# Patient Record
Sex: FEMALE | Race: WHITE | Employment: OTHER | URBAN - METROPOLITAN AREA
[De-identification: names, ages, dates, MRNs, and addresses within clinical notes are randomized per-mention and may not be internally consistent; named-entity substitution may affect disease eponyms.]

---

## 2019-11-13 ENCOUNTER — HOSPITAL ENCOUNTER (OUTPATIENT)
Dept: PHYSICAL THERAPY | Age: 74
Discharge: HOME | End: 2019-11-13
Payer: MEDICARE

## 2019-11-13 PROCEDURE — 97162 PT EVAL MOD COMPLEX 30 MIN: CPT

## 2019-11-13 PROCEDURE — 97110 THERAPEUTIC EXERCISES: CPT

## 2019-11-13 NOTE — PROGRESS NOTES
Ambulatory/Rehab Services H2 Model Falls Risk Assessment    Risk Factors:       No Risk Factors Identified Ability to Rise from Chair:       (0)  Ability to rise in a single movement    Falls Prevention Plan: Mobility Assistance Device (specify):  Cane   Total: (5 or greater = High Risk): 1    ©2010 [facility] . [facility] Patent #[id number].  Federal Law prohibits the replication, distribution or use without written permission from [facility] Hotelements

## 2019-11-13 NOTE — PROGRESS NOTES
Coco Carlisle  : 1945  [place]  Secondary: [facility] at [address] [facility]  [address], [address]  [phone]   [phone]       OUTPATIENT PHYSICAL THERAPY: Daily Treatment Note 2019   ICD-10: Treatment Diagnosis:   R26.2 Difficulty in walking, not elsewhere classified     M25.662 Stiffness of left knee, not elsewhere classified     M25.562 Pain in left knee     Pre-treatment Symptoms/Complaints:The patient reported a recent onset of l knee pain, swelling, and weakness. She is presently ambulating with a cane, and has difficulty rising from a seated posture or climbing stairs. She has been referred to physical therapy for treatment. Pain: Initial: Pain Intensity 1: 9  Post Session:  9/10   Medications Last Reviewed:  2019  Updated Objective Findings:  See evaluation note from today   TREATMENT:   Therapeutic Exercise: ( 15): The patient received treatment as below to improve mobility, strength and balance. Required moderate verbal and manual cues to promote proper body alignment, promote proper body posture and promote proper body mechanics. Progressed resistance, range and repetitions as indicated. The patient received exercise instruction followed by patient demonstration of the exercises to include AROM, PROM, and strengthening exercises along with heel cord stretching and proper use of a cane with ambulation to stabilize the patient's LE and improve AROM to decrease spasms, pain, and improve function. She is also to continue aquatic therapy for strengthening and improved AROM. [facility] Portal  Evaluation: ( X )  Manual Therapy (     ):   Therapeutic Modalities:   Observation/Orthostatic Postural Assessment:   Independent ambulation with a cane.   Palpation:   Tenderness to l medial and posterior joint line  ROM: AROM : KNEE     R        L   Extension           0       +8   Flexion 140     115                    Strength:    +3/5 L LE Strength    5/5 R LE Strength           Special Tests: N/A  Neurological Screen: N/A  Functional Mobility: Independent with a cane. Balance:  Good. Treatment/Session Summary:    · Response to Treatment:  The patient was instructed in heel cord stretching, along with gait instruction with a cane. The patient will continue with aquatic exercises. · Communication/Consultation:  None today  · Equipment provided today:  None today  · Recommendations/Intent for next treatment session: Next visit will focus on advancements in therapeutic exercises to improve function.   Treatment Plan of Care Effective Dates:  11/13/2019 to 01/13/2020  Total Treatment Billable Duration:  45 minutes  PT Patient Time In/Time Out  Time In: 0900  Time Out: [address] [facility] [address] PT    Future Appointments   Date Time Provider Priscila Ness   11/18/2019 11:00 AM [facility]   11/20/2019 10:[facility]   11/25/2019 11:00 AM [facility]   11/27/2019 10:00 AM [facility]

## 2019-11-13 NOTE — THERAPY EVALUATION
Candis Alvarado  : 1945  [place] / [place] / Product Type: Medicare /  [address]  at [address], [address]  [phone]   [phone]      OUTPATIENT PHYSICAL THERAPY:Initial Assessment 2019    ICD-10: Treatment Diagnosis:   R26.2 Difficulty in walking, not elsewhere classified     M25.662 Stiffness of left knee, not elsewhere classified     M25.562 Pain in left knee     Precautions/Allergies:   Pcn [penicillins]   Fall Risk Score: 1 (? 5 = High Risk)  MD Orders: Evaluate and treat MEDICAL/REFERRING DIAGNOSIS:  Pain in left knee [M25.562]   DATE OF ONSET: 10/2019  REFERRING PHYSICIAN: Prema Marie PA*  RETURN PHYSICIAN APPOINTMENT: TBD     INITIAL ASSESSMENT:  Ms. Debbie Phelan reported a recent onset of l knee pain, swelling, and weakness. She is presently ambulating with a cane, and has difficulty rising from a seated posture or climbing stairs. She has been referred to physical therapy for treatment. PROBLEM LIST (Impacting functional limitations):  1. Decreased Strength  2. Decreased ADL/Functional Activities  3. Increased Pain  4. Decreased Activity Tolerance INTERVENTIONS PLANNED:  1. Home Exercise Program (HEP)  2. Neuromuscular Re-education/Strengthening  3. Range of Motion (ROM)  4. Therapeutic Exercise/Strengthening  5. Aquatic Therapy   TREATMENT PLAN:  Effective Dates: 2019 TO 2020. Frequency/Duration: 2 times a week for 8 weeks  GOALS: (Goals have been discussed and agreed upon with patient.)  SHORT-TERM FUNCTIONAL GOALS: Time Frame: 4 weeks  1. Increase LEFS 4 points to decrease pain 1-2 levels. 2. Increase L knee AROM 10 degrees to decrease pain 1-2 levels. 3. Increase L LE strength to allow initiation of stairs. DISCHARGE GOALS: Time Frame: 8 weeks  1. Increase LEFS 9 points to decrease pain +2 levels.   2. Increase L LE strength to allow reciprocal stairs. 3. Demonstrate independence in home exercises to allow DC from physical therapy. Rehabilitation Potential For Stated Goals: Good  Regarding Zehra Chacon's therapy, I certify that the treatment plan above will be carried out by a therapist or under their direction. Thank you for this referral,  Kimberlee Taylor., PT     Referring Physician Signature: VAISHALI Mcclure*              Date                    The information in this section was collected on 11/13/2019 (except where otherwise noted). HISTORY:   History of Present Injury/Illness (Reason for Referral): The patient reported a recent onset of l knee pain, swelling, and weakness. She is presently ambulating with a cane, and has difficulty rising from a seated posture or climbing stairs. She has been referred to physical therapy for treatment. Past Medical History/Comorbidities:   Ms. Marni Walters  has no past medical history on file. Ms. Marni Walters  has a past surgical history that includes hx gyn. Social History/Living Environment:    Independent  Prior Level of Function/Work/Activity:  Retired  Dominant Side:         RIGHT  Current Medications:       Current Outpatient Medications:     carboxymethylcellulose sodium (REFRESH CELLUVISC) 1 % ophthalmic solution, Apply 1 Drop to eye as needed. , Disp: , Rfl:     peg 400-propylene glycol (SYSTANE) 0.4-0.3 % drop, Administer  to left eye as needed. , Disp: , Rfl:     methylPREDNISolone (MEDROL, PO,) 4 mg tablet, Per dose pack instructions, Disp: 1 Each, Rfl: 0    RESTASIS 0.05 % ophthalmic emulsion, , Disp: , Rfl:     raloxifene (EVISTA) 60 mg tablet, Take 60 mg by mouth daily. Indications: pm, Disp: , Rfl:    Date Last Reviewed:  11/13/2019   Number of Personal Factors/Comorbidities that affect the Plan of Care: 3+: HIGH COMPLEXITY   EXAMINATION:     Observation/Orthostatic Postural Assessment:   Independent ambulation with a cane.   Palpation:   Tenderness to l medial and posterior joint line  ROM: AROM : KNEE     R        L   Extension           0       +8   Flexion            140     115                    Strength:    +3/5 L LE Strength    5/5 R LE Strength           Special Tests: N/A  Neurological Screen: N/A  Functional Mobility: Independent with a cane. Balance:  Good. Body Structures Involved:  1. Joints  2. Muscles Body Functions Affected:  1. Sensory/Pain  2. Neuromusculoskeletal  3. Movement Related Activities and Participation Affected:  1. General Tasks and Demands  2. Mobility  3. Community, Social and [place]   Number of elements (examined above) that affect the Plan of Care: 4+: HIGH COMPLEXITY   CLINICAL PRESENTATION:   Presentation: Evolving clinical presentation with changing clinical characteristics: MODERATE COMPLEXITY   CLINICAL DECISION MAKING:   Outcome Measure: Tool Used: Lower Extremity Functional Scale (LEFS)  Score:  Initial: 28/80 Most Recent: X/80 (Date: -- )   Interpretation of Score: 20 questions each scored on a 5 point scale with 0 representing \"extreme difficulty or unable to perform\" and 4 representing \"no difficulty\". The lower the score, the greater the functional disability. 80/80 represents no disability. Minimal detectable change is 9 points. Medical Necessity:   · Patient demonstrates good rehab potential due to higher previous functional level. Reason for Services/Other Comments:  · Patient continues to require skilled intervention due to her limited ability to ambulate independently due to L knee pain. .   Use of outcome tool(s) and clinical judgement create a POC that gives a: Questionable prediction of patient's progress: Bonnie Hood PT

## 2019-11-18 ENCOUNTER — HOSPITAL ENCOUNTER (OUTPATIENT)
Dept: PHYSICAL THERAPY | Age: 74
Discharge: HOME | End: 2019-11-18
Payer: MEDICARE

## 2019-11-18 PROCEDURE — 97110 THERAPEUTIC EXERCISES: CPT

## 2019-11-18 NOTE — PROGRESS NOTES
Sourav Ga  : 1945  [place]  [place] [address] at [address] [facility]  [address], [address]  [phone]   [phone]       OUTPATIENT PHYSICAL THERAPY: Daily Treatment Note 2019   ICD-10: Treatment Diagnosis:   R26.2 Difficulty in walking, not elsewhere classified     M25.662 Stiffness of left knee, not elsewhere classified     M25.562 Pain in left knee     Pre-treatment Symptoms/Complaints: Patient reports knee is still very tender with certain movements, but she has been able to walk better. Pain: Initial: Pain Intensity 1: 7  Post Session:  9/10   Medications Last Reviewed:  2019  Updated Objective Findings:  See evaluation note from today   TREATMENT:   Therapeutic Exercise: ( ):  The patient received treatment as below to improve mobility, strength and balance. Required moderate verbal and manual cues to promote proper body alignment, promote proper body posture and promote proper body mechanics. Progressed resistance, range and repetitions as indicated. The patient received exercise instruction followed by patient demonstration of the exercises to include AROM, PROM, and strengthening exercises along with heel cord stretching and proper use of a cane with ambulation to stabilize the patient's LE and improve AROM to decrease spasms, pain, and improve function. She is also to continue aquatic therapy for strengthening and improved AROM. [facility] Portal  Evaluation:  Manual Therapy (     ):   Therapeutic Modalities:   Aquatic Therapy (60 minutes): Aquatic treatment performed per flow grid for Decompression, Ease of movement, Low impact and reduced weight bearing activity and increased pain level. Cues provided for proper posture and correct body mechaincis. Patient has difficulty with prolonged standing, walking and stairs.       Aquatic Exercise Log       Date  19 Date Date   Date   Date     Activity/ Exercise Parameters Parameters Parameters Parameters Parameters   Walking forward 5 min       Walking backward        Walking sideways 5 min         Marching X 20       LE Exercises          Hip Flex/Ext X 20         Hip Abd/Add X 20         Hip flex/knee ext  X 20         Calf raises          Knee Flex X 20         Squats          Step Ups X 20       UE Exercises          Squeeze In          Push Down          Pull Down        Deep H2O/ Noodles          Bicycle  4 min seated       Cross   Country          Scissors          Stretches          Hamstrings 3 x 10          Heelcords            Observation/Orthostatic Postural Assessment:   Independent ambulation with a cane. Palpation:   Tenderness to l medial and posterior joint line  ROM: AROM : KNEE     R        L   Extension           0       +8   Flexion            140     115                    Strength:    +3/5 L LE Strength    5/5 R LE Strength           Special Tests: N/A  Neurological Screen: N/A  Functional Mobility: Independent with a cane. Balance:  Good. Treatment/Session Summary:    · Response to Treatment: Patient tolerated treatment with mild discomfort today. Patient demonstrated good effort with all aquatic exercises and indicated less discomfort following treatment today. Patient purchased a brace and has started using a cane to help with ambulation. Instructed patient to continue home exercises as directed. · Communication/Consultation:  None today  · Equipment provided today:  None today  · Recommendations/Intent for next treatment session: Next visit will focus on advancements in therapeutic exercises to improve function.   Treatment Plan of Care Effective Dates:  11/13/2019 to 01/13/2020  Total Treatment Billable Duration:  60 minutes  PT Patient Time In/Time Out  Time In: 1100  Time Out: [address] [place], [place]    Future Appointments   Date Time Provider Priscila Ness   11/20/2019 10:00 AM Gunnar Galindo Ulic[facility] Perrin [facility]   11/25/2019 11:00 AM Jacque SIMPSON [facility]   11/27/2019 10:00 AM Jacque SIMPSON [facility]

## 2019-11-20 ENCOUNTER — HOSPITAL ENCOUNTER (OUTPATIENT)
Dept: PHYSICAL THERAPY | Age: 74
Discharge: HOME | End: 2019-11-20
Payer: MEDICARE

## 2019-11-20 PROCEDURE — 97113 AQUATIC THERAPY/EXERCISES: CPT

## 2019-11-20 NOTE — PROGRESS NOTES
Josiah Moreland  : 1945  [place]  Secondary: [facility] at [address]  [address], [address]  [phone]   [phone]       OUTPATIENT PHYSICAL THERAPY: Daily Treatment Note 2019   ICD-10: Treatment Diagnosis:   R26.2 Difficulty in walking, not elsewhere classified     M25.662 Stiffness of left knee, not elsewhere classified     M25.562 Pain in left knee     Pre-treatment Symptoms/Complaints: Patient reports knee is still very tight, but pain has improved some. Pain: Initial: Pain Intensity 1: 6  Post Session:  9/10   Medications Last Reviewed:  2019  Updated Objective Findings:  None Today   TREATMENT:   Therapeutic Exercise: ( ):  The patient received treatment as below to improve mobility, strength and balance. Required moderate verbal and manual cues to promote proper body alignment, promote proper body posture and promote proper body mechanics. Progressed resistance, range and repetitions as indicated. The patient received exercise instruction followed by patient demonstration of the exercises to include AROM, PROM, and strengthening exercises along with heel cord stretching and proper use of a cane with ambulation to stabilize the patient's LE and improve AROM to decrease spasms, pain, and improve function. She is also to continue aquatic therapy for strengthening and improved AROM. [facility] Portal  Evaluation:  Manual Therapy (     ):   Therapeutic Modalities:   Aquatic Therapy (60 minutes): Aquatic treatment performed per flow grid for Decompression, Ease of movement, Low impact and reduced weight bearing activity and increased pain level. Cues provided for proper posture and correct body mechaincis. Patient has difficulty with prolonged standing, walking and stairs.       Aquatic Exercise Log       Date  19 Date  19 Date   Date   Date     Activity/ Exercise Parameters Parameters Parameters Parameters Parameters   Walking forward 5 min 5 min      Walking backward        Walking sideways 5 min 5 min        Marching X 20 X 20      LE Exercises          Hip Flex/Ext X 20 X 20        Hip Abd/Add X 20 X 20        Hip flex/knee ext  X 20 X 20        Calf raises          Knee Flex X 20 X 20         Squats          Step Ups X 20 X 20      UE Exercises          Squeeze In          Push Down          Pull Down        Deep H2O/ Noodles          Bicycle  4 min seated       Cross   Country        Flutter kicks   5 min        Scissors          Stretches          Hamstrings 3 x 10  3 x10        Heelcords            Observation/Orthostatic Postural Assessment:   Independent ambulation with a cane. Palpation:   Tenderness to l medial and posterior joint line  ROM: AROM : KNEE     R        L   Extension           0       +8   Flexion            140     115                    Strength:    +3/5 L LE Strength    5/5 R LE Strength           Special Tests: N/A  Neurological Screen: N/A  Functional Mobility: Independent with a cane. Balance:  Good. Treatment/Session Summary:    · Response to Treatment: Patient tolerated treatment with mild discomfort today. Patient demonstrated good effort with all aquatic exercises and seems very pleased with the pool. Suggested patient try to ice some today to help with swelling. Patient indicated that the surgie  really seemed to help. Instructed patient to continue home exercises as directed. · Communication/Consultation:  None today  · Equipment provided today:  None today  · Recommendations/Intent for next treatment session: Next visit will focus on advancements in therapeutic exercises to improve function.   Treatment Plan of Care Effective Dates:  11/13/2019 to 01/13/2020  Total Treatment Billable Duration:  60 minutes  PT Patient Time In/Time Out  Time In: 1000  Time Out: [address] [place], [place]    Future Appointments   Date Time Provider Priscila Ness   11/25/2019 11:00 AM Mikhail Newberry [facility]   11/27/2019 10:00 AM Mikhail SIMPSON [facility]

## 2019-11-25 ENCOUNTER — HOSPITAL ENCOUNTER (OUTPATIENT)
Dept: PHYSICAL THERAPY | Age: 74
Discharge: HOME | End: 2019-11-25
Payer: MEDICARE

## 2019-11-25 PROCEDURE — 97113 AQUATIC THERAPY/EXERCISES: CPT

## 2019-11-25 NOTE — PROGRESS NOTES
Ashley Thomas  : 1945  [place]  Secondary: [facility] at [address]  [address], [address]  [phone]   [phone]       OUTPATIENT PHYSICAL THERAPY: Daily Treatment Note 2019   ICD-10: Treatment Diagnosis:   R26.2 Difficulty in walking, not elsewhere classified     M25.662 Stiffness of left knee, not elsewhere classified     M25.562 Pain in left knee     Pre-treatment Symptoms/Complaints: Patient reports knee is still painful with certain movements, but swelling is better. Pain: Initial: Pain Intensity 1: (P) 4  Post Session:  3/10   Medications Last Reviewed:  2019  Updated Objective Findings:  None Today   TREATMENT:   Therapeutic Exercise: ( ):  The patient received treatment as below to improve mobility, strength and balance. Required moderate verbal and manual cues to promote proper body alignment, promote proper body posture and promote proper body mechanics. Progressed resistance, range and repetitions as indicated. The patient received exercise instruction followed by patient demonstration of the exercises to include AROM, PROM, and strengthening exercises along with heel cord stretching and proper use of a cane with ambulation to stabilize the patient's LE and improve AROM to decrease spasms, pain, and improve function. She is also to continue aquatic therapy for strengthening and improved AROM. [facility] Portal  Evaluation:  Manual Therapy (     ):   Therapeutic Modalities:   Aquatic Therapy (60 minutes): Aquatic treatment performed per flow grid for Decompression, Ease of movement, Low impact and reduced weight bearing activity and increased pain level. Cues provided for proper posture and correct body mechaincis. Patient has difficulty with prolonged standing, walking and stairs.       Aquatic Exercise Log       Date  19 Date  19 Date  19 Date Date     Activity/ Exercise Parameters Parameters Parameters Parameters Parameters   Walking forward 5 min 5 min 5 min     Walking backward        Walking sideways 5 min 5 min 5 min       Marching X 20 X 20 X 20     LE Exercises          Hip Flex/Ext X 20 X 20 X 20       Hip Abd/Add X 20 X 20 X 20       Hip flex/knee ext  X 20 X 20 X 20       Calf raises          Knee Flex X 20 X 20  X 20       Squats          Step Ups X 20 X 20 X 20     UE Exercises          Squeeze In          Push Down          Pull Down        Deep H2O/ Noodles          Bicycle  4 min seated  8 min seated       Cross   Country        Flutter kicks   5 min        Scissors   5 min       Stretches          Hamstrings 3 x 10  3 x10 4 x 30 sec       Heelcords            Observation/Orthostatic Postural Assessment:   Independent ambulation with a cane. Palpation:   Tenderness to l medial and posterior joint line  ROM: AROM : KNEE     R        L   Extension           0       +8   Flexion            140     115                    Strength:    +3/5 L LE Strength    5/5 R LE Strength           Special Tests: N/A  Neurological Screen: N/A  Functional Mobility: Independent with a cane. Balance:  Good. Treatment/Session Summary:    · Response to Treatment: Patient tolerated treatment with mild discomfort today. Patient demonstrated good effort with all aquatic exercises and seems very pleased with the pool. Patient indicates that the swelling is much better, but she hasn't been able to go without any type of bracing due to the pain with certain movements and prolonged walking. Patient seems pleased with her progress and the fact that she has been able to do more since starting the pool. Instructed patient to continue home exercises as directed.   · Communication/Consultation:  None today  · Equipment provided today:  None today  · Recommendations/Intent for next treatment session: Next visit will focus on advancements in therapeutic exercises to improve function.   Treatment Plan of Care Effective Dates:  11/13/2019 to 01/13/2020  Total Treatment Billable Duration:  60 minutes  PT Patient Time In/Time Out  Time In: (P) 1100  Time Out: (P) [address] [facility]    Future Appointments   Date Time Provider Priscila Ness   11/27/2019 10:00 AM [facility]   12/3/2019  9:00 AM [facility]   [facility]   [facility]   [facility]   12/16/2019 11:00 AM Essence Agrawal, PT [facility]   [facility]/18[facility]2019 [facility]   [facility]23[facility]2019 [facility]

## 2019-11-27 ENCOUNTER — HOSPITAL ENCOUNTER (OUTPATIENT)
Dept: PHYSICAL THERAPY | Age: 74
Discharge: HOME | End: 2019-11-27
Payer: MEDICARE

## 2019-11-27 PROCEDURE — 97113 AQUATIC THERAPY/EXERCISES: CPT

## 2019-11-27 NOTE — PROGRESS NOTES
[facility]  : 1945  [place]  [place]: [place] [address] at [place] [address] Therapy  [address], [address]  [phone]   [phone]       OUTPATIENT PHYSICAL THERAPY: Daily Treatment Note 2019   ICD-10: Treatment Diagnosis:   R26.2 Difficulty in walking, not elsewhere classified     M25.662 Stiffness of left knee, not elsewhere classified     M25.562 Pain in left knee     Pre-treatment Symptoms/Complaints: Patient reports knee is coming along slowly. Pain: Initial: Pain Intensity 1: 4  Post Session:  3/10   Medications Last Reviewed:  2019  Updated Objective Findings:  None Today   TREATMENT:   Therapeutic Exercise: ( ):  The patient received treatment as below to improve mobility, strength and balance. Required moderate verbal and manual cues to promote proper body alignment, promote proper body posture and promote proper body mechanics. Progressed resistance, range and repetitions as indicated. The patient received exercise instruction followed by patient demonstration of the exercises to include AROM, PROM, and strengthening exercises along with heel cord stretching and proper use of a cane with ambulation to stabilize the patient's LE and improve AROM to decrease spasms, pain, and improve function. She is also to continue aquatic therapy for strengthening and improved AROM. [facility] Portal  Evaluation:  Manual Therapy (     ):   Therapeutic Modalities:   Aquatic Therapy (60 minutes): Aquatic treatment performed per flow grid for Decompression, Ease of movement, Low impact and reduced weight bearing activity and increased pain level. Cues provided for proper posture and correct body mechaincis. Patient has difficulty with prolonged standing, walking and stairs.       Aquatic Exercise Log       Date  19 Date  19 Date  19 Date  19 Date     Activity/ Exercise Parameters Parameters Parameters Parameters Parameters   Walking forward 5 min 5 min 5 min 5 min    Walking backward        Walking sideways 5 min 5 min 5 min 5 min      Marching X 20 X 20 X 20 X 20    LE Exercises          Hip Flex/Ext X 20 X 20 X 20 X 20      Hip Abd/Add X 20 X 20 X 20 X 20      Hip flex/knee ext  X 20 X 20 X 20 X 20      Calf raises          Knee Flex X 20 X 20  X 20 X 20      Squats          Step Ups X 20 X 20 X 20 X 20    UE Exercises          Squeeze In          Push Down          Pull Down        Deep H2O/ Noodles          Bicycle  4 min seated  8 min seated   4 min seated    Cross   Country        Flutter kicks   5 min        Scissors   5 min 4 min seated      Stretches          Hamstrings 3 x 10  3 x10 4 x 30 sec 4 x 30 sec      Heelcords            Observation/Orthostatic Postural Assessment:   Independent ambulation with a cane. Palpation:   Tenderness to l medial and posterior joint line  ROM: AROM : KNEE     R        L   Extension           0       +8   Flexion            140     115                    Strength:    +3/5 L LE Strength    5/5 R LE Strength           Special Tests: N/A  Neurological Screen: N/A  Functional Mobility: Independent with a cane. Balance:  Good. Treatment/Session Summary:    · Response to Treatment: Patient tolerated treatment with mild discomfort today. Patient demonstrated good effort with all aquatic exercises and continues to be very pleased with the pool. Patient indicates she is not wearing the brace as much as she was and everyday she seems to be able to do a little more. Patient hopes she doesn't have to have a shot. Instructed patient to continue home exercises as directed. · Communication/Consultation:  None today  · Equipment provided today:  None today  · Recommendations/Intent for next treatment session: Next visit will focus on advancements in therapeutic exercises to improve function.   Treatment Plan of Care Effective Dates:  11/13/2019 to 01/13/2020  Total Treatment Billable Duration:  60 minutes  PT Patient Time In/Time Out  Time In: 1000  Time Out: [address] [place], [place]    Future Appointments   Date Time Provider Priscila Ness   12/3/2019  9:00 AM Eric Shall [facility]   12/5/2019  1:00 PM [facility] Shall [facility]   12/9/2019 10:00 AM Eric Shall [facility]   12/11/2019 10:00 AM Eric Shall [facility]   12/16/2019 11:00 AM Tressa Crockett, PT [facility]   12/18/2019 10:00 AM Eric Shall [facility]   12/23/2019 10:00 AM Eric Shall [facility]   12/26/2019  9:00 AM Eric Shall [facility]

## 2019-12-03 ENCOUNTER — HOSPITAL ENCOUNTER (OUTPATIENT)
Dept: PHYSICAL THERAPY | Age: 74
Discharge: HOME | End: 2019-12-03
Payer: MEDICARE

## 2019-12-03 PROCEDURE — 97113 AQUATIC THERAPY/EXERCISES: CPT

## 2019-12-03 NOTE — PROGRESS NOTES
Rachid Sanchez  : 1945  [place]  Secondary: [facility] at [address]  [address], [address]  [phone]   [phone]       OUTPATIENT PHYSICAL THERAPY: Daily Treatment Note 12/3/2019   ICD-10: Treatment Diagnosis:   R26.2 Difficulty in walking, not elsewhere classified     M25.662 Stiffness of left knee, not elsewhere classified     M25.562 Pain in left knee     Pre-treatment Symptoms/Complaints: Patient reports knee has felt better and not as swollen since last week. Pain: Initial: Pain Intensity 1: 4  Post Session:  3/10   Medications Last Reviewed:  12/3/2019  Updated Objective Findings:  None Today   TREATMENT:   Therapeutic Exercise: ( ):  The patient received treatment as below to improve mobility, strength and balance. Required moderate verbal and manual cues to promote proper body alignment, promote proper body posture and promote proper body mechanics. Progressed resistance, range and repetitions as indicated. The patient received exercise instruction followed by patient demonstration of the exercises to include AROM, PROM, and strengthening exercises along with heel cord stretching and proper use of a cane with ambulation to stabilize the patient's LE and improve AROM to decrease spasms, pain, and improve function. She is also to continue aquatic therapy for strengthening and improved AROM. [facility] Portal  Evaluation:  Manual Therapy (     ):   Therapeutic Modalities:   Aquatic Therapy (60 minutes): Aquatic treatment performed per flow grid for Decompression, Ease of movement, Low impact and reduced weight bearing activity and increased pain level. Cues provided for proper posture and correct body mechaincis. Patient has difficulty with prolonged standing, walking and stairs.       Aquatic Exercise Log       Date  19 Date  19 Date  19 Date  19 Date Activity/ Exercise Parameters Parameters Parameters Parameters Parameters   Walking forward 5 min 5 min 5 min 5 min 5 min   Walking backward        Walking sideways 5 min 5 min 5 min 5 min 5 min     Marching X 20 X 20 X 20 X 20 X 30   LE Exercises          Hip Flex/Ext X 20 X 20 X 20 X 20 X 30     Hip Abd/Add X 20 X 20 X 20 X 20 X 30     Hip flex/knee ext  X 20 X 20 X 20 X 20 X 30     Calf raises          Knee Flex X 20 X 20  X 20 X 20 X 30     Squats          Step Ups X 20 X 20 X 20 X 20 X 30   UE Exercises          Squeeze In          Push Down          Pull Down        Deep H2O/ Noodles          Bicycle  4 min seated  8 min seated   4 min seated 6 min seated   Cross   Country        Flutter kicks   5 min        Scissors   5 min 4 min seated 4 min seated     Stretches          Hamstrings 3 x 10  3 x10 4 x 30 sec 4 x 30 sec 4 x 30 sec     Heelcords            Observation/Orthostatic Postural Assessment:   Independent ambulation with a cane. Palpation:   Tenderness to l medial and posterior joint line  ROM: AROM : KNEE     R        L   Extension           0       +8   Flexion            140     115                    Strength:    +3/5 L LE Strength    5/5 R LE Strength           Special Tests: N/A  Neurological Screen: N/A  Functional Mobility: Independent with a cane. Balance:  Good. Treatment/Session Summary:    · Response to Treatment: Patient tolerated treatment with mild discomfort today. Patient demonstrated good effort with all aquatic exercises and continues to be very pleased with the pool. Patient plans to contact the doctor later this month to discuss a possible shot before her trip. Instructed patient to continue home exercises as directed. · Communication/Consultation:  None today  · Equipment provided today:  None today  · Recommendations/Intent for next treatment session: Next visit will focus on advancements in therapeutic exercises to improve function.   Treatment Plan of Care Effective Dates:  11/13/2019 to 01/13/2020  Total Treatment Billable Duration:  60 minutes  PT Patient Time In/Time Out  Time In: 0900  Time Out: [address] [place] [address]    Future Appointments   Date Time Provider Priscila Ness   12/5/2019  1:00 PM [facility]   12/9/2019 10:00 AM [facility]   12/11/2019 10:00 AM [facility]   12/16/2019 11:00 AM Samanta Cordoba, PT [facility]   12/18/2019 10:00 AM [facility]   12/23/2019 10:00 AM [facility]   12/26/2019  9:00 AM [facility]

## 2019-12-05 ENCOUNTER — HOSPITAL ENCOUNTER (OUTPATIENT)
Dept: PHYSICAL THERAPY | Age: 74
Discharge: HOME | End: 2019-12-05
Payer: MEDICARE

## 2019-12-05 PROCEDURE — 97113 AQUATIC THERAPY/EXERCISES: CPT

## 2019-12-05 NOTE — PROGRESS NOTES
Anselmo Cordoba  : 1945  [place]  [place]: [place] [address] at [address] [facility]  [address], [address]  [phone]   [phone]       OUTPATIENT PHYSICAL THERAPY: Daily Treatment Note 2019   ICD-10: Treatment Diagnosis:   R26.2 Difficulty in walking, not elsewhere classified     M25.662 Stiffness of left knee, not elsewhere classified     M25.562 Pain in left knee     Pre-treatment Symptoms/Complaints: Patient reports she saw the orthopedic today and had an x-ray which looked good, but is scheduled to have an MRI on Monday. And will follow up to plan next course of action. Pain: Initial: Pain Intensity 1: 2  Post Session:  3/10   Medications Last Reviewed:  2019  Updated Objective Findings:  None Today   TREATMENT:   Therapeutic Exercise: ( ):  The patient received treatment as below to improve mobility, strength and balance. Required moderate verbal and manual cues to promote proper body alignment, promote proper body posture and promote proper body mechanics. Progressed resistance, range and repetitions as indicated. The patient received exercise instruction followed by patient demonstration of the exercises to include AROM, PROM, and strengthening exercises along with heel cord stretching and proper use of a cane with ambulation to stabilize the patient's LE and improve AROM to decrease spasms, pain, and improve function. She is also to continue aquatic therapy for strengthening and improved AROM. [facility] Portal  Evaluation:  Manual Therapy (     ):   Therapeutic Modalities:   Aquatic Therapy (60 minutes): Aquatic treatment performed per flow grid for Decompression, Ease of movement, Low impact and reduced weight bearing activity and increased pain level. Cues provided for proper posture and correct body mechaincis. Patient has difficulty with prolonged standing, walking and stairs. Aquatic Exercise Log       Date  11-18-19 Date  11-20-19 Date  11-25-19 Date  11-27-19 Date  12-3-19 Date  12-5-19   Activity/ Exercise Parameters Parameters Parameters Parameters Parameters Parameters   Walking forward 5 min 5 min 5 min 5 min 5 min 5 min   Walking backward         Walking sideways 5 min 5 min 5 min 5 min 5 min 5 min     Marching X 20 X 20 X 20 X 20 X 30 X 30   LE Exercises           Hip Flex/Ext X 20 X 20 X 20 X 20 X 30 X 30     Hip Abd/Add X 20 X 20 X 20 X 20 X 30 X 30     Hip flex/knee ext  X 20 X 20 X 20 X 20 X 30 X 30     Calf raises           Knee Flex X 20 X 20  X 20 X 20 X 30 X 30     Squats      X 20     Step Ups X 20 X 20 X 20 X 20 X 30 X 30   UE Exercises           Squeeze In           Push Down           Pull Down         Deep H2O/ Noodles           Bicycle  4 min seated  8 min seated   4 min seated 6 min seated 6 min  seated   Cross   Country         Flutter kicks   5 min         Scissors   5 min 4 min seated 4 min seated 6 min seated      Stretches           Hamstrings 3 x 10  3 x10 4 x 30 sec 4 x 30 sec 4 x 30 sec 3 x 45 sec     Heelcords             Observation/Orthostatic Postural Assessment:   Independent ambulation with a cane. Palpation:   Tenderness to l medial and posterior joint line  ROM: AROM : KNEE     R        L   Extension           0       +8   Flexion            140     115                    Strength:    +3/5 L LE Strength    5/5 R LE Strength           Special Tests: N/A  Neurological Screen: N/A  Functional Mobility: Independent with a cane. Balance:  Good. Treatment/Session Summary:    · Response to Treatment: Patient tolerated treatment with mild discomfort today. Patient demonstrated good effort with all aquatic exercises and continues to be very pleased with the pool. Patient was pleased that her knee wasn't as swollen and her pain level has decreased tremendously. Instructed patient to continue home exercises as directed.   · Communication/Consultation: None today  · Equipment provided today:  None today  · Recommendations/Intent for next treatment session: Next visit will focus on advancements in therapeutic exercises to improve function.   Treatment Plan of Care Effective Dates:  11/13/2019 to 01/13/2020  Total Treatment Billable Duration:  60 minutes  PT Patient Time In/Time Out  Time In: 0100  Time Out: 600 Merlene Broderick PTA    Future Appointments   Date Time Provider Priscila Ness   12/9/2019 10:00 AM Lazara Thibodeaux [facility]   12/11/2019 10:00 AM Lazara Thibodeaux [facility]   12/16/2019 11:00 AM Jaden Dubon, PT [facility]   12/18/2019 10:00 AM Lazara Thibodeaux [facility]   12/23/2019 10:00 AM Lazara Thibodeaux [facility]   12/26/2019  9:00 AM Lazara Thibodeaux [facility]

## 2019-12-09 ENCOUNTER — APPOINTMENT (OUTPATIENT)
Dept: PHYSICAL THERAPY | Age: 74
End: 2019-12-09
Payer: MEDICARE

## 2019-12-10 ENCOUNTER — HOSPITAL ENCOUNTER (OUTPATIENT)
Dept: PHYSICAL THERAPY | Age: 74
Discharge: HOME | End: 2019-12-10
Payer: MEDICARE

## 2019-12-10 PROCEDURE — 97113 AQUATIC THERAPY/EXERCISES: CPT

## 2019-12-10 NOTE — PROGRESS NOTES
Izzy Ornelas  : 1945  [place]  Secondary: [facility] at [address]  [address], [address]  [phone]   [phone]       OUTPATIENT PHYSICAL THERAPY: Daily Treatment Note 12/10/2019   ICD-10: Treatment Diagnosis:   R26.2 Difficulty in walking, not elsewhere classified     M25.662 Stiffness of left knee, not elsewhere classified     M25.562 Pain in left knee     Pre-treatment Symptoms/Complaints: Patient reports she had the MRI today and is scheduled to the doctor later this week. Pain: Initial: Pain Intensity 1: 2  Post Session:  1/10   Medications Last Reviewed:  12/10/2019  Updated Objective Findings:  None Today   TREATMENT:   Therapeutic Exercise: ( ):  The patient received treatment as below to improve mobility, strength and balance. Required moderate verbal and manual cues to promote proper body alignment, promote proper body posture and promote proper body mechanics. Progressed resistance, range and repetitions as indicated. The patient received exercise instruction followed by patient demonstration of the exercises to include AROM, PROM, and strengthening exercises along with heel cord stretching and proper use of a cane with ambulation to stabilize the patient's LE and improve AROM to decrease spasms, pain, and improve function. She is also to continue aquatic therapy for strengthening and improved AROM. [facility] Portal  Evaluation:  Manual Therapy (     ):   Therapeutic Modalities:   Aquatic Therapy (60 minutes): Aquatic treatment performed per flow grid for Decompression, Ease of movement, Low impact and reduced weight bearing activity and increased pain level. Cues provided for proper posture and correct body mechaincis. Patient has difficulty with prolonged standing, walking and stairs.       Aquatic Exercise Log       Date  19 Date  12-3-19 Date  19 Date  12-10-19 Activity/ Exercise Parameters Parameters Parameters Parameters   Walking forward 5 min 5 min 5 min 5 min   Walking backward       Walking sideways 5 min 5 min 5 min 5 min     Marching X 20 X 30 X 30 X 30   LE Exercises         Hip Flex/Ext X 20 X 30 X 30 X 30     Hip Abd/Add X 20 X 30 X 30 X 30     Hip flex/knee ext  X 20 X 30 X 30 X 30     Calf raises         Knee Flex X 20 X 30 X 30 X 30     Squats   X 20 X 20     Step Ups X 20 X 30 X 30 X 30   UE Exercises         Squeeze In         Push Down         Pull Down       Deep H2O/ Noodles         Bicycle  4 min seated 6 min seated 6 min  seated 8 min   seated   Cross   Country       Flutter kicks          Scissors 4 min seated 4 min seated 6 min seated  8 min seated     Stretches         Hamstrings 4 x 30 sec 4 x 30 sec 3 x 45 sec 4 x 40 sec     Heelcords           Observation/Orthostatic Postural Assessment:   Independent ambulation with a cane. Palpation:   Tenderness to l medial and posterior joint line  ROM: AROM : KNEE     R        L   Extension           0       +8   Flexion            140     115                    Strength:    +3/5 L LE Strength    5/5 R LE Strength           Special Tests: N/A  Neurological Screen: N/A  Functional Mobility: Independent with a cane. Balance:  Good. Treatment/Session Summary:    · Response to Treatment: Patient tolerated treatment with mild discomfort today. Patient demonstrated good effort with all aquatic exercises and continues to be very pleased with the pool. Patient hopes that she can wait and get a shot closer to her trip. Instructed patient to continue home exercises as directed. · Communication/Consultation:  None today  · Equipment provided today:  None today  · Recommendations/Intent for next treatment session: Next visit will focus on advancements in therapeutic exercises to improve function.   Treatment Plan of Care Effective Dates:  11/13/2019 to 01/13/2020  Total Treatment Billable Duration:  60 minutes  PT Patient Time In/Time Out  Time In: 0300  Time Out: [address], [place]    Future Appointments   Date Time Provider Priscila Ness   12/12/2019  1:00 PM [facility]   12/16/2019 11:00 AM Anabella Isidro., PT [facility]   12/18/2019 10:00 AM [facility]   12/23/2019 10:00 AM Lizandro Villalobos [facility]   12/26/2019  9:00 AM Lizandro Villalobos [facility]

## 2019-12-11 ENCOUNTER — APPOINTMENT (OUTPATIENT)
Dept: PHYSICAL THERAPY | Age: 74
End: 2019-12-11
Payer: MEDICARE

## 2019-12-12 ENCOUNTER — HOSPITAL ENCOUNTER (OUTPATIENT)
Dept: PHYSICAL THERAPY | Age: 74
Discharge: HOME | End: 2019-12-12
Payer: MEDICARE

## 2019-12-12 PROCEDURE — 97113 AQUATIC THERAPY/EXERCISES: CPT

## 2019-12-12 NOTE — PROGRESS NOTES
Cindy Galo  : 1945  [place]  [place]: [place] [address] [facility]  [address], [address]  [phone]   [phone]       OUTPATIENT PHYSICAL THERAPY: Daily Treatment Note 2019   ICD-10: Treatment Diagnosis:   R26.2 Difficulty in walking, not elsewhere classified     M25.662 Stiffness of left knee, not elsewhere classified     M25.562 Pain in left knee     Pre-treatment Symptoms/Complaints: Patient reports she saw the doctor today and has a sizeable tear in her mensicus. Pain: Initial: Pain Intensity 1: 4  Post Session:  1/10   Medications Last Reviewed:  2019  Updated Objective Findings:  None Today   TREATMENT:   Therapeutic Exercise: ( ):  The patient received treatment as below to improve mobility, strength and balance. Required moderate verbal and manual cues to promote proper body alignment, promote proper body posture and promote proper body mechanics. Progressed resistance, range and repetitions as indicated. The patient received exercise instruction followed by patient demonstration of the exercises to include AROM, PROM, and strengthening exercises along with heel cord stretching and proper use of a cane with ambulation to stabilize the patient's LE and improve AROM to decrease spasms, pain, and improve function. She is also to continue aquatic therapy for strengthening and improved AROM. [facility] Portal  Evaluation:  Manual Therapy (     ):   Therapeutic Modalities:   Aquatic Therapy (60 minutes): Aquatic treatment performed per flow grid for Decompression, Ease of movement, Low impact and reduced weight bearing activity and increased pain level. Cues provided for proper posture and correct body mechaincis. Patient has difficulty with prolonged standing, walking and stairs.       Aquatic Exercise Log       Date  19 Date  12-3-19 Date  19 Date  12-10-19 Date  12-12-19   Activity/ Exercise Parameters Parameters Parameters Parameters Parameters   Walking forward 5 min 5 min 5 min 5 min 5 min   Walking backward        Walking sideways 5 min 5 min 5 min 5 min 5 min     Marching X 20 X 30 X 30 X 30 X 30   LE Exercises          Hip Flex/Ext X 20 X 30 X 30 X 30 X 30     Hip Abd/Add X 20 X 30 X 30 X 30 X 30     Hip flex/knee ext  X 20 X 30 X 30 X 30 X 30     Calf raises          Knee Flex X 20 X 30 X 30 X 30 X 30     Squats   X 20 X 20      Step Ups X 20 X 30 X 30 X 30 X 30   UE Exercises          Squeeze In          Push Down          Pull Down        Deep H2O/ Noodles          Bicycle  4 min seated 6 min seated 6 min  seated 8 min   seated 5 min seated   Cross   Country        Flutter kicks           Scissors 4 min seated 4 min seated 6 min seated  8 min seated 8 min seated     Stretches          Hamstrings 4 x 30 sec 4 x 30 sec 3 x 45 sec 4 x 40 sec 4 x 30 sec     Heelcords            Observation/Orthostatic Postural Assessment:   Independent ambulation with a cane. Palpation:   Tenderness to l medial and posterior joint line  ROM: AROM : KNEE     R        L   Extension           0       +8   Flexion            140     115                    Strength:    +3/5 L LE Strength    5/5 R LE Strength           Special Tests: N/A  Neurological Screen: N/A  Functional Mobility: Independent with a cane. Balance:  Good. Treatment/Session Summary:    · Response to Treatment: Patient tolerated treatment with mild discomfort today. Patient demonstrated good effort with all aquatic exercises and continues to be very pleased with the pool. Patient is scheduled to get a shot on Dec 23 before her trip. She also states that she may have surgery when she returns. Instructed patient to continue home exercises as directed.   · Communication/Consultation:  None today  · Equipment provided today:  None today  · Recommendations/Intent for next treatment session: Next visit will focus on advancements in therapeutic exercises to improve function.   Treatment Plan of Care Effective Dates:  11/13/2019 to 01/13/2020  Total Treatment Billable Duration:  60 minutes  PT Patient Time In/Time Out  Time In: 0100  Time Out: 600 Merlene Broderick PTA    Future Appointments   Date Time Provider Priscila Ness   12/16/2019 11:00 AM Beth Huffman, PT [facility]   12/18/2019 10:00 AM Pinky Brown [facility]   12/23/2019 10:00 AM Pinky Brown [facility]   12/26/2019  9:00 AM Pinky Brown [facility]

## 2019-12-16 ENCOUNTER — HOSPITAL ENCOUNTER (OUTPATIENT)
Dept: PHYSICAL THERAPY | Age: 74
Discharge: HOME | End: 2019-12-16
Payer: MEDICARE

## 2019-12-16 PROCEDURE — 97110 THERAPEUTIC EXERCISES: CPT

## 2019-12-16 NOTE — PROGRESS NOTES
Evelyn Dubois  : 1945  [place]  [place]: [place] [address] [place] at [address] [facility]  [address], [address]  [phone]   [phone]       OUTPATIENT PHYSICAL THERAPY: Daily Treatment Note 2019   ICD-10: Treatment Diagnosis:   R26.2 Difficulty in walking, not elsewhere classified     M25.662 Stiffness of left knee, not elsewhere classified     M25.562 Pain in left knee     Pre-treatment Symptoms/Complaints: Patient reports she saw the doctor today and has a sizeable tear in her mensicus. She is going bto continue therapy since it has been beneficial and likely receive a steroid injection before leaving for [facility] in two weeks. Pain: Initial: Pain Intensity 1: 2  Post Session:  1/10   Medications Last Reviewed:  2019  Updated Objective Findings:  None Today   TREATMENT:   Therapeutic Exercise: ( 40): The patient received treatment as below to improve mobility, strength and balance. Required moderate verbal and manual cues to promote proper body alignment, promote proper body posture and promote proper body mechanics. Progressed resistance, range and repetitions as indicated. The patient received exercise instruction followed by patient demonstration of the exercises to include AROM, PROM, and strengthening exercises along with heel cord stretching and independent ambulation to stabilize the patient's LE and improve AROM to decrease spasms, pain, and improve function. She is also to continue aquatic therapy for strengthening and improved AROM. [facility]  Evaluation:  Manual Therapy (     ):   Therapeutic Modalities:   Aquatic Therapy ( minutes): Aquatic treatment performed per flow grid for Decompression, Ease of movement, Low impact and reduced weight bearing activity and increased pain level. Cues provided for proper posture and correct body mechaincis.    Patient has difficulty with prolonged standing, walking and stairs. Aquatic Exercise Log       Date  11-27-19 Date  12-3-19 Date  12-5-19 Date  12-10-19 Date  12-12-19   Activity/ Exercise Parameters Parameters Parameters Parameters Parameters   Walking forward 5 min 5 min 5 min 5 min 5 min   Walking backward        Walking sideways 5 min 5 min 5 min 5 min 5 min     Marching X 20 X 30 X 30 X 30 X 30   LE Exercises          Hip Flex/Ext X 20 X 30 X 30 X 30 X 30     Hip Abd/Add X 20 X 30 X 30 X 30 X 30     Hip flex/knee ext  X 20 X 30 X 30 X 30 X 30     Calf raises          Knee Flex X 20 X 30 X 30 X 30 X 30     Squats   X 20 X 20      Step Ups X 20 X 30 X 30 X 30 X 30   UE Exercises          Squeeze In          Push Down          Pull Down        Deep H2O/ Noodles          Bicycle  4 min seated 6 min seated 6 min  seated 8 min   seated 5 min seated   Cross   Country        Flutter kicks           Scissors 4 min seated 4 min seated 6 min seated  8 min seated 8 min seated     Stretches          Hamstrings 4 x 30 sec 4 x 30 sec 3 x 45 sec 4 x 40 sec 4 x 30 sec     Heelcords            Observation/Orthostatic Postural Assessment:   Independent ambulation with a cane. Palpation:   Tenderness to l medial and posterior joint line  ROM: AROM : KNEE     R        L       L (12/16)   Extension           0       +8     0   Flexion            140     115   118                    Strength:                                       12/16   +3/5 L LE Strength              -4/5    5/5 R LE Strength                5/5           Special Tests: N/A  Neurological Screen: N/A  Functional Mobility: Independent. Balance:  Good. Treatment/Session Summary:    · Response to Treatment: Patient is pleased with her progress, and will schedule a steroid injection on Dec 23 before her trip. She also states that she may have surgery when she returns. Instructed patient to continue home exercises as directed.   · Communication/Consultation:  None today  · Equipment provided today:  None today  · Recommendations/Intent for next treatment session: Next visit will focus on advancements in therapeutic exercises to improve function.   Treatment Plan of Care Effective Dates:  11/13/2019 to 01/13/2020  Total Treatment Billable Duration:  40 minutes  PT Patient Time In/Time Out  Time In: 1100  Time Out: [id number] St. Timur Vargas, PT,     Future Appointments   Date Time Provider Priscila Ness   12/20/2019  1:00 [place] [facility] [place]   [facility]   12/26/2019  9:00 AM [facility]

## 2019-12-16 NOTE — THERAPY EVALUATION
Marli Douglass  : 1945  [place] / [place] / Product Type: Medicare /  [address]  at [address], [facility] [address]  [phone]   [phone]      OUTPATIENT PHYSICAL THERAPY:Progress Report 2019    ICD-10: Treatment Diagnosis:   R26.2 Difficulty in walking, not elsewhere classified     M25.662 Stiffness of left knee, not elsewhere classified     M25.562 Pain in left knee     Precautions/Allergies:   Pcn [penicillins]   Fall Risk Score: 1 (? 5 = High Risk)  MD Orders: Evaluate and treat MEDICAL/REFERRING DIAGNOSIS:  Pain in left knee [M25.562]   DATE OF ONSET: 10/2019  REFERRING PHYSICIAN: Prema Marie PA*  RETURN PHYSICIAN APPOINTMENT: TBD     INITIAL ASSESSMENT:  Ms. Kylah Valadez reported a recent onset of l knee pain, swelling, and weakness. She is presently ambulating with a cane, and has difficulty rising from a seated posture or climbing stairs. She has been referred to physical therapy for treatment. PROBLEM LIST (Impacting functional limitations):  1. Decreased Strength  2. Decreased ADL/Functional Activities  3. Increased Pain  4. Decreased Activity Tolerance INTERVENTIONS PLANNED:  1. Home Exercise Program (HEP)  2. Neuromuscular Re-education/Strengthening  3. Range of Motion (ROM)  4. Therapeutic Exercise/Strengthening  5. Aquatic Therapy   TREATMENT PLAN:  Effective Dates: 2019 TO 2020. Frequency/Duration: 2 times a week for 8 weeks  GOALS: (Goals have been discussed and agreed upon with patient.)  SHORT-TERM FUNCTIONAL GOALS: Time Frame: 4 weeks                                  ALL STGS ACHIEVED  1. Increase LEFS 4 points to decrease pain 1-2 levels. 2. Increase L knee AROM 10 degrees to decrease pain 1-2 levels. 3. Increase L LE strength to allow initiation of stairs. DISCHARGE GOALS: Time Frame: 8 weeks  1. Increase LEFS 9 points to decrease pain +2 levels. ACHIEVED  2. Increase L LE strength to allow reciprocal stairs. IN PROGRESS  3. Demonstrate independence in home exercises to allow DC from physical therapy. IN PROGRESS  Rehabilitation Potential For Stated Goals: Good  Regarding Mariama Chacon's therapy, I certify that the treatment plan above will be carried out by a therapist or under their direction. Thank you for this referral,  Maykel Krueger., PT     Referring Physician Signature: Flaquita Solanor, PA*              Date                    The information in this section was collected on 11/13/2019 (except where otherwise noted). HISTORY:   History of Present Injury/Illness (Reason for Referral): The patient reported a recent onset of l knee pain, swelling, and weakness. She is presently ambulating with a cane, and has difficulty rising from a seated posture or climbing stairs. She has been referred to physical therapy for treatment. Past Medical History/Comorbidities:   Ms. Jorge Lozoya  has no past medical history on file. Ms. Jorge Lozoya  has a past surgical history that includes hx gyn. Social History/Living Environment:    Independent  Prior Level of Function/Work/Activity:  Retired  Dominant Side:         RIGHT  Current Medications:       Current Outpatient Medications:     carboxymethylcellulose sodium (REFRESH CELLUVISC) 1 % ophthalmic solution, Apply 1 Drop to eye as needed. , Disp: , Rfl:     peg 400-propylene glycol (SYSTANE) 0.4-0.3 % drop, Administer  to left eye as needed. , Disp: , Rfl:     methylPREDNISolone (MEDROL, PO,) 4 mg tablet, Per dose pack instructions, Disp: 1 Each, Rfl: 0    RESTASIS 0.05 % ophthalmic emulsion, , Disp: , Rfl:     raloxifene (EVISTA) 60 mg tablet, Take 60 mg by mouth daily.  Indications: pm, Disp: , Rfl:    Date Last Reviewed:  12/16/2019   Number of Personal Factors/Comorbidities that affect the Plan of Care: 3+: HIGH COMPLEXITY   EXAMINATION:     Observation/Orthostatic Postural Assessment:   Independent ambulation with a cane. Palpation:   Tenderness to l medial and posterior joint line  ROM: AROM : KNEE     R        L       L (12/16)   Extension           0       +8     0   Flexion            140     115   118                    Strength:                                       12/16   +3/5 L LE Strength              -4/5    5/5 R LE Strength                5/5           Special Tests: N/A  Neurological Screen: N/A  Functional Mobility: Independent. Balance:  Good. Body Structures Involved:  1. Joints  2. Muscles Body Functions Affected:  1. Sensory/Pain  2. Neuromusculoskeletal  3. Movement Related Activities and Participation Affected:  1. General Tasks and Demands  2. Mobility  3. Community, Social and [place]   Number of elements (examined above) that affect the Plan of Care: 4+: HIGH COMPLEXITY   CLINICAL PRESENTATION:   Presentation: Evolving clinical presentation with changing clinical characteristics: MODERATE COMPLEXITY   CLINICAL DECISION MAKING:   Outcome Measure: Tool Used: Lower Extremity Functional Scale (LEFS)  Score:  Initial: 28/80 Most Recent: 39/80 (Date:12/16/19 )   Interpretation of Score: 20 questions each scored on a 5 point scale with 0 representing \"extreme difficulty or unable to perform\" and 4 representing \"no difficulty\". The lower the score, the greater the functional disability. 80/80 represents no disability. Minimal detectable change is 9 points. Medical Necessity:   · Patient demonstrates good rehab potential due to higher previous functional level. Reason for Services/Other Comments:  · Patient continues to require skilled intervention due to her limited ability to ambulate independently due to L knee pain. .   Use of outcome tool(s) and clinical judgement create a POC that gives a: Questionable prediction of patient's progress: Bonnie Baez PT

## 2019-12-18 ENCOUNTER — APPOINTMENT (OUTPATIENT)
Dept: PHYSICAL THERAPY | Age: 74
End: 2019-12-18
Payer: MEDICARE

## 2019-12-20 ENCOUNTER — APPOINTMENT (OUTPATIENT)
Dept: PHYSICAL THERAPY | Age: 74
End: 2019-12-20
Payer: MEDICARE

## 2019-12-23 ENCOUNTER — APPOINTMENT (OUTPATIENT)
Dept: PHYSICAL THERAPY | Age: 74
End: 2019-12-23
Payer: MEDICARE

## 2019-12-26 ENCOUNTER — APPOINTMENT (OUTPATIENT)
Dept: PHYSICAL THERAPY | Age: 74
End: 2019-12-26
Payer: MEDICARE

## 2020-01-30 RX ORDER — SODIUM CHLORIDE 0.9 % (FLUSH) 0.9 %
5-40 SYRINGE (ML) INJECTION EVERY 8 HOURS
Status: CANCELLED | OUTPATIENT
Start: 2020-01-30

## 2020-01-30 RX ORDER — SODIUM CHLORIDE 0.9 % (FLUSH) 0.9 %
5-40 SYRINGE (ML) INJECTION AS NEEDED
Status: CANCELLED | OUTPATIENT
Start: 2020-01-30

## 2020-02-13 ENCOUNTER — ANESTHESIA EVENT (OUTPATIENT)
Dept: SURGERY | Age: 75
End: 2020-02-13
Payer: MEDICARE

## 2020-02-14 ENCOUNTER — ANESTHESIA (OUTPATIENT)
Dept: SURGERY | Age: 75
End: 2020-02-14
Payer: MEDICARE

## 2020-02-14 ENCOUNTER — HOSPITAL ENCOUNTER (OUTPATIENT)
Age: 75
Setting detail: OUTPATIENT SURGERY
Discharge: HOME | End: 2020-02-14
Attending: ORTHOPAEDIC SURGERY | Admitting: ORTHOPAEDIC SURGERY
Payer: MEDICARE

## 2020-02-14 VITALS
WEIGHT: 125 LBS | OXYGEN SATURATION: 97 % | SYSTOLIC BLOOD PRESSURE: 156 MMHG | DIASTOLIC BLOOD PRESSURE: 63 MMHG | BODY MASS INDEX: 22.5 KG/M2 | HEART RATE: 69 BPM | TEMPERATURE: 99.4 F | RESPIRATION RATE: 17 BRPM

## 2020-02-14 PROCEDURE — 74011250636 HC RX REV CODE- 250/636: Performed by: NURSE ANESTHETIST, CERTIFIED REGISTERED

## 2020-02-14 PROCEDURE — 77030010509 HC AIRWY LMA MSK TELE -A: Performed by: ANESTHESIOLOGY

## 2020-02-14 PROCEDURE — 76010000138 HC OR TIME 0.5 TO 1 HR: Performed by: ORTHOPAEDIC SURGERY

## 2020-02-14 PROCEDURE — 74011000250 HC RX REV CODE- 250: Performed by: NURSE ANESTHETIST, CERTIFIED REGISTERED

## 2020-02-14 PROCEDURE — 74011250637 HC RX REV CODE- 250/637: Performed by: ANESTHESIOLOGY

## 2020-02-14 PROCEDURE — 77030006668 HC BLD SHV MENSCS STRY -B: Performed by: ORTHOPAEDIC SURGERY

## 2020-02-14 PROCEDURE — 77030038012 HC WND COBLATN S&N -F: Performed by: ORTHOPAEDIC SURGERY

## 2020-02-14 PROCEDURE — 74011250636 HC RX REV CODE- 250/636: Performed by: ANESTHESIOLOGY

## 2020-02-14 PROCEDURE — 76210000006 HC OR PH I REC 0.5 TO 1 HR: Performed by: ORTHOPAEDIC SURGERY

## 2020-02-14 PROCEDURE — 76210000020 HC REC RM PH II FIRST 0.5 HR: Performed by: ORTHOPAEDIC SURGERY

## 2020-02-14 PROCEDURE — 74011250636 HC RX REV CODE- 250/636: Performed by: ORTHOPAEDIC SURGERY

## 2020-02-14 PROCEDURE — 76060000032 HC ANESTHESIA 0.5 TO 1 HR: Performed by: ORTHOPAEDIC SURGERY

## 2020-02-14 PROCEDURE — 77030000032 HC CUF TRNQT ZIMM -B: Performed by: ORTHOPAEDIC SURGERY

## 2020-02-14 PROCEDURE — 77030029203 HC IRR KT CYSTO/TUR BBMI -A: Performed by: ORTHOPAEDIC SURGERY

## 2020-02-14 PROCEDURE — 77030018836 HC SOL IRR NACL ICUM -A: Performed by: ORTHOPAEDIC SURGERY

## 2020-02-14 RX ORDER — MIDAZOLAM HYDROCHLORIDE 1 MG/ML
2 INJECTION, SOLUTION INTRAMUSCULAR; INTRAVENOUS ONCE
Status: DISCONTINUED | OUTPATIENT
Start: 2020-02-14 | End: 2020-02-14 | Stop reason: HOSPADM

## 2020-02-14 RX ORDER — KETOROLAC TROMETHAMINE 30 MG/ML
INJECTION, SOLUTION INTRAMUSCULAR; INTRAVENOUS AS NEEDED
Status: DISCONTINUED | OUTPATIENT
Start: 2020-02-14 | End: 2020-02-14 | Stop reason: HOSPADM

## 2020-02-14 RX ORDER — NALOXONE HYDROCHLORIDE 0.4 MG/ML
0.04 INJECTION, SOLUTION INTRAMUSCULAR; INTRAVENOUS; SUBCUTANEOUS
Status: DISCONTINUED | OUTPATIENT
Start: 2020-02-14 | End: 2020-02-14 | Stop reason: HOSPADM

## 2020-02-14 RX ORDER — SODIUM CHLORIDE, SODIUM LACTATE, POTASSIUM CHLORIDE, CALCIUM CHLORIDE 600; 310; 30; 20 MG/100ML; MG/100ML; MG/100ML; MG/100ML
100 INJECTION, SOLUTION INTRAVENOUS CONTINUOUS
Status: DISCONTINUED | OUTPATIENT
Start: 2020-02-14 | End: 2020-02-14 | Stop reason: HOSPADM

## 2020-02-14 RX ORDER — ONDANSETRON 2 MG/ML
INJECTION INTRAMUSCULAR; INTRAVENOUS AS NEEDED
Status: DISCONTINUED | OUTPATIENT
Start: 2020-02-14 | End: 2020-02-14 | Stop reason: HOSPADM

## 2020-02-14 RX ORDER — ROPIVACAINE HYDROCHLORIDE 5 MG/ML
INJECTION, SOLUTION EPIDURAL; INFILTRATION; PERINEURAL AS NEEDED
Status: DISCONTINUED | OUTPATIENT
Start: 2020-02-14 | End: 2020-02-14 | Stop reason: HOSPADM

## 2020-02-14 RX ORDER — SODIUM CHLORIDE 0.9 % (FLUSH) 0.9 %
5-40 SYRINGE (ML) INJECTION EVERY 8 HOURS
Status: DISCONTINUED | OUTPATIENT
Start: 2020-02-14 | End: 2020-02-14 | Stop reason: HOSPADM

## 2020-02-14 RX ORDER — FENTANYL CITRATE 50 UG/ML
100 INJECTION, SOLUTION INTRAMUSCULAR; INTRAVENOUS ONCE
Status: DISCONTINUED | OUTPATIENT
Start: 2020-02-14 | End: 2020-02-14 | Stop reason: HOSPADM

## 2020-02-14 RX ORDER — PROPOFOL 10 MG/ML
INJECTION, EMULSION INTRAVENOUS AS NEEDED
Status: DISCONTINUED | OUTPATIENT
Start: 2020-02-14 | End: 2020-02-14 | Stop reason: HOSPADM

## 2020-02-14 RX ORDER — LIDOCAINE HYDROCHLORIDE 20 MG/ML
INJECTION, SOLUTION EPIDURAL; INFILTRATION; INTRACAUDAL; PERINEURAL AS NEEDED
Status: DISCONTINUED | OUTPATIENT
Start: 2020-02-14 | End: 2020-02-14 | Stop reason: HOSPADM

## 2020-02-14 RX ORDER — DEXAMETHASONE SODIUM PHOSPHATE 4 MG/ML
INJECTION, SOLUTION INTRA-ARTICULAR; INTRALESIONAL; INTRAMUSCULAR; INTRAVENOUS; SOFT TISSUE AS NEEDED
Status: DISCONTINUED | OUTPATIENT
Start: 2020-02-14 | End: 2020-02-14 | Stop reason: HOSPADM

## 2020-02-14 RX ORDER — SODIUM CHLORIDE 0.9 % (FLUSH) 0.9 %
5-40 SYRINGE (ML) INJECTION AS NEEDED
Status: DISCONTINUED | OUTPATIENT
Start: 2020-02-14 | End: 2020-02-14 | Stop reason: HOSPADM

## 2020-02-14 RX ORDER — FENTANYL CITRATE 50 UG/ML
INJECTION, SOLUTION INTRAMUSCULAR; INTRAVENOUS AS NEEDED
Status: DISCONTINUED | OUTPATIENT
Start: 2020-02-14 | End: 2020-02-14 | Stop reason: HOSPADM

## 2020-02-14 RX ORDER — LIDOCAINE HYDROCHLORIDE 10 MG/ML
0.1 INJECTION INFILTRATION; PERINEURAL AS NEEDED
Status: DISCONTINUED | OUTPATIENT
Start: 2020-02-14 | End: 2020-02-14 | Stop reason: HOSPADM

## 2020-02-14 RX ORDER — OXYCODONE HYDROCHLORIDE 5 MG/1
5 TABLET ORAL
Status: COMPLETED | OUTPATIENT
Start: 2020-02-14 | End: 2020-02-14

## 2020-02-14 RX ORDER — HYDROMORPHONE HYDROCHLORIDE 2 MG/ML
0.5 INJECTION, SOLUTION INTRAMUSCULAR; INTRAVENOUS; SUBCUTANEOUS
Status: DISCONTINUED | OUTPATIENT
Start: 2020-02-14 | End: 2020-02-14 | Stop reason: HOSPADM

## 2020-02-14 RX ORDER — MIDAZOLAM HYDROCHLORIDE 1 MG/ML
2 INJECTION, SOLUTION INTRAMUSCULAR; INTRAVENOUS
Status: DISCONTINUED | OUTPATIENT
Start: 2020-02-14 | End: 2020-02-14 | Stop reason: HOSPADM

## 2020-02-14 RX ADMIN — KETOROLAC TROMETHAMINE 30 MG: 30 INJECTION, SOLUTION INTRAMUSCULAR; INTRAVENOUS at 16:11

## 2020-02-14 RX ADMIN — DEXAMETHASONE SODIUM PHOSPHATE 4 MG: 4 INJECTION, SOLUTION INTRAMUSCULAR; INTRAVENOUS at 15:56

## 2020-02-14 RX ADMIN — PHENYLEPHRINE HYDROCHLORIDE 50 MCG: 10 INJECTION INTRAVENOUS at 15:55

## 2020-02-14 RX ADMIN — ONDANSETRON 4 MG: 2 INJECTION INTRAMUSCULAR; INTRAVENOUS at 15:56

## 2020-02-14 RX ADMIN — LIDOCAINE HYDROCHLORIDE 80 MG: 20 INJECTION, SOLUTION EPIDURAL; INFILTRATION; INTRACAUDAL; PERINEURAL at 15:50

## 2020-02-14 RX ADMIN — OXYCODONE HYDROCHLORIDE 5 MG: 5 TABLET ORAL at 16:50

## 2020-02-14 RX ADMIN — FENTANYL CITRATE 50 MCG: 50 INJECTION INTRAMUSCULAR; INTRAVENOUS at 15:50

## 2020-02-14 RX ADMIN — PROPOFOL 200 MG: 10 INJECTION, EMULSION INTRAVENOUS at 15:50

## 2020-02-14 RX ADMIN — SODIUM CHLORIDE, SODIUM LACTATE, POTASSIUM CHLORIDE, AND CALCIUM CHLORIDE 100 ML/HR: 600; 310; 30; 20 INJECTION, SOLUTION INTRAVENOUS at 14:02

## 2020-02-14 NOTE — DISCHARGE INSTRUCTIONS
POST OPERATIVE INSTRUCTIONS FOR KNEE ARTHROSCOPY    1. Unless you receive other instructions, you may weight bear as tolerated      2. Apply ice to your knee for 20-30 minutes several times per day for the first 3 days and then as needed. Icing will decrease the amount of inflammation and is helpful after exercise    3. You may remove the dressings the following day and apply waterproof band aids. Some bleeding and drainage may persist for several days following  surgery. Waterproof band aids may be used while showering and avoid tub baths for two weeks. 4. You will be given pain medications and do not drive under the influence. Some patients take pain medication at night and antiinflammatory medication during day  when pain decreases. 5. You may be given Phenergan for nausea    6. You will receive a phone call from our office notifying you of your follow up visit    7. If any problems call [phone]    ACTIVITY  · As tolerated and as directed by your doctor. · Bathe or shower as directed by your doctor. DIET  · Clear liquids until no nausea or vomiting; then light diet for the first day. · Advance to regular diet on second day, unless your doctor orders otherwise. · If nausea and vomiting continues, call your doctor. PAIN  · Take pain medication as directed by your doctor. · Call your doctor if pain is NOT relieved by medication. · DO NOT take aspirin of blood thinners unless directed by your doctor. DRESSING CARE       CALL YOUR DOCTOR IF   · Excessive bleeding that does not stop after holding pressure over the area  · Temperature of 101 degrees F or above  · Excessive redness, swelling or bruising, and/ or green or yellow, smelly discharge from incision    AFTER ANESTHESIA   · For the first 24 hours: DO NOT Drive, Drink alcoholic beverages, or Make important decisions. · Be aware of dizziness following anesthesia and while taking pain medication.      APPOINTMENT DATE/ TIME    YOUR DOCTOR'S PHONE NUMBER       DISCHARGE SUMMARY from Nurse    PATIENT INSTRUCTIONS:    After general anesthesia or intravenous sedation, for 24 hours or while taking prescription Narcotics:  · Limit your activities  · Do not drive and operate hazardous machinery  · Do not make important personal or business decisions  · Do  not drink alcoholic beverages  · If you have not urinated within 8 hours after discharge, please contact your surgeon on call. *  Please give a list of your current medications to your Primary Care Provider. *  Please update this list whenever your medications are discontinued, doses are      changed, or new medications (including over-the-counter products) are added. *  Please carry medication information at all times in case of emergency situations. These are general instructions for a healthy lifestyle:    No smoking/ No tobacco products/ Avoid exposure to second hand smoke    Surgeon General's Warning:  Quitting smoking now greatly reduces serious risk to your health. Obesity, smoking, and sedentary lifestyle greatly increases your risk for illness    A healthy diet, regular physical exercise & weight monitoring are important for maintaining a healthy lifestyle    You may be retaining fluid if you have a history of heart failure or if you experience any of the following symptoms:  Weight gain of 3 pounds or more overnight or 5 pounds in a week, increased swelling in our hands or feet or shortness of breath while lying flat in bed. Please call your doctor as soon as you notice any of these symptoms; do not wait until your next office visit. Recognize signs and symptoms of STROKE:    F-face looks uneven    A-arms unable to move or move unevenly    S-speech slurred or non-existent    T-time-call 911 as soon as signs and symptoms begin-DO NOT go       Back to bed or wait to see if you get better-TIME IS BRAIN.

## 2020-02-14 NOTE — OP NOTES
[facility]  OPERATIVE REPORT    Name:  Gurjit Harvey  MR#:  [id number]  :  1945  ACCOUNT #:  [de-identified]  DATE OF SERVICE:  2020    PREOPERATIVE DIAGNOSIS:  Meniscal tear of the left knee. POSTOPERATIVE DIAGNOSES:  1. Tears of the medial and lateral meniscus. 2.  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 OPERATIONS PERFORMED:  1. Arthroscopic abrasion arthroplasty of the left knee, 74114.  2.  Medial and lateral meniscectomy. 59218    SURGEON:  Brad Avalos MD    ASSISTANT:  None. ANESTHESIA:  General.    ESTIMATED BLOOD LOSS:  Minimal.    COMPLICATIONS:  None. SPECIMENS REMOVED:  None. IMPLANTS:  None. PROCEDURE:  After an adequate level of general anesthesia was obtained, the left leg was prepped and draped in the usual sterile fashion. A tourniquet was used for the procedure. Photos made for the patient. Anteromedial and anterolateral portals were made. The patellofemoral joint overall looked good. There was just some mild chondromalacia of the ACL. PCL intact. In the medial compartment, there was a tear of the posterior one-third of the medial meniscus resected with the basket and the shaver and was left with a stable rim. The lateral compartment was the main pathology and the patient did have some valgus. She had a large macerated tear of the posterior one-half of the lateral meniscus and was resected with the basket and the shaver, but she was getting some areas of grade III chondromalacia involving the lateral compartment and an abrasion arthroplasty was performed with the small areas of eburnated bone. The knee was then copiously irrigated. The gutters were cleared. She tolerated the procedure well.       MD REAGAN [id number]  D:  2020 16:15  T:  2020 17:47  JOB #:  [id number]  CC:  [address] [place] [address]

## 2020-02-14 NOTE — ANESTHESIA PREPROCEDURE EVALUATION
Relevant Problems   No relevant active problems       Anesthetic History   No history of anesthetic complications            Review of Systems / Medical History  Pertinent labs reviewed    Pulmonary  Within defined limits                 Neuro/Psych   Within defined limits           Cardiovascular  Within defined limits                Exercise tolerance: >4 METS     GI/Hepatic/Renal  Within defined limits              Endo/Other  Within defined limits           Other Findings              Physical Exam    Airway  Mallampati: II  TM Distance: 4 - 6 cm  Neck ROM: normal range of motion   Mouth opening: Normal     Cardiovascular  Regular rate and rhythm,  S1 and S2 normal,  no murmur, click, rub, or gallop             Dental  No notable dental hx       Pulmonary  Breath sounds clear to auscultation               Abdominal  GI exam deferred       Other Findings            Anesthetic Plan    ASA: 1  Anesthesia type: general          Induction: Intravenous  Anesthetic plan and risks discussed with: Patient and Family

## 2020-02-14 NOTE — BRIEF OP NOTE
BRIEF OPERATIVE NOTE    Date of Procedure: 2/14/2020   Preoperative Diagnosis: Complex tear of medial meniscus of left knee as current injury, initial encounter [S83.232A]  Other tear of lateral meniscus of left knee as current injury, initial encounter [S83.282A]  Postoperative Diagnosis: Complex tear of medial meniscus of left knee as current injury, initial encounter [S83.232A]  Other tear of lateral meniscus of left knee as current injury, initial encounter [S83.282A]    Procedure(s):  LEFT KNEE ARTHROSCOPY WITH MEDIAL/LATERAL MENISCECTOMY  Surgeon(s) and Role:      Brian Stiles MD - Primary         Surgical Assistant:       Surgical Staff:  Circ-1: Chente Sanchez Tech-1: Duane Lederer  Event Time In Time Out   Incision Start 1600    Incision Close 1612      Anesthesia: General   Estimated Blood Loss:     Specimens: * No specimens in log *   Findings:      Complications:       Implants: * No implants in log *

## 2020-02-14 NOTE — H&P
Outpatient Surgery History and Physical:  Mitchell Miller was seen and examined. CHIEF COMPLAINT:   LT knee . PE:   There were no vitals taken for this visit. Heart:   Regular rhythm      Lungs:  Are clear      Past Medical History: There are no active problems to display for this patient. Surgical History:   Past Surgical History:   Procedure Laterality Date    HX GYN      C-sec  SA    HX HERNIA REPAIR      umb       Social History: Patient  reports that she has never smoked. She has never used smokeless tobacco. She reports current alcohol use. She reports that she does not use drugs. Family History:   Family History   Problem Relation Age of Onset    Cancer Mother     Heart Disease Mother     Cancer Father        Allergies: Reviewed per EMR  Allergies   Allergen Reactions    Pcn [Penicillins] Rash      & Any derivative       Medications:    No current facility-administered medications on file prior to encounter. Current Outpatient Medications on File Prior to Encounter   Medication Sig    peg 400-propylene glycol (SYSTANE) 0.4-0.3 % drop Administer  to left eye as needed.  RESTASIS 0.05 % ophthalmic emulsion 1 Drop every twelve (12) hours.  raloxifene (EVISTA) 60 mg tablet Take 60 mg by mouth daily. Indications: pm       The surgery is planned for the  LT knee. History and physical has been reviewed. The patient has been examined. There have been no significant clinical changes since the completion of the originally dated History and Physical.  Patient identified by surgeon; surgical site was confirmed by patient and surgeon. The patient is here today for outpatient surgery. I have examined the patient, no changes are noted in the patient's medical status. Necessity for the procedure/care is still present and the history and physical above is current. See the office notes for the full long term history of the problem.   Please see the recent office notes for the musculoskeletal examination.     Signed By: Bhavik Monsalve MD     February 14, 2020 6:56 AM

## 2020-02-14 NOTE — ANESTHESIA POSTPROCEDURE EVALUATION
Procedure(s):  LEFT KNEE ARTHROSCOPY WITH MEDIAL/LATERAL MENISCECTOMY. general    <BSHSIANPOST>    Vitals Value Taken Time   /70 2/14/2020  4:41 PM   Temp 37.4 °C (99.4 °F) 2/14/2020  4:23 PM   Pulse 73 2/14/2020  4:42 PM   Resp 14 2/14/2020  4:37 PM   SpO2 95 % 2/14/2020  4:42 PM   Vitals shown include unvalidated device data.

## 2020-03-06 ENCOUNTER — HOSPITAL ENCOUNTER (OUTPATIENT)
Dept: PHYSICAL THERAPY | Age: 75
Discharge: HOME | End: 2020-03-06
Payer: MEDICARE

## 2020-03-06 PROCEDURE — 97162 PT EVAL MOD COMPLEX 30 MIN: CPT

## 2020-03-06 PROCEDURE — 97110 THERAPEUTIC EXERCISES: CPT

## 2020-03-06 NOTE — THERAPY EVALUATION
Howard Arellano  : 1945  [place] / Product Type: Medicare /  [address]  at [facility]  [address] [place] [address], [address]  [phone]   [phone]      OUTPATIENT PHYSICAL THERAPY:Initial Assessment 3/6/2020    ICD-10: Treatment Diagnosis:   R26.2 Difficulty in walking, not elsewhere classified     M25.662 Stiffness of left knee, not elsewhere classified     M25.562 Pain in left knee       Precautions/Allergies:   Pcn [penicillins]   Fall Risk Score: 0 (? 5 = High Risk)  MD Orders: Evaluate and treat MEDICAL/REFERRING DIAGNOSIS:  Complex tear of medial meniscus, current injury, left knee, initial encounter [S83.232A]  Other tear of lateral meniscus, current injury, left knee, initial encounter [Z53.152F]   DATE OF ONSET: 2020  REFERRING PHYSICIAN: Tyron Hernandez MD  RETURN PHYSICIAN APPOINTMENT: TBD     INITIAL ASSESSMENT:  Ms. Kayden Gunn reported a L knee arthroscopy on 2020 for a partial menisectomy of her L knee medial and lateral meniscus. She is ambulating independently and has been referred to physical therapy for treatment. PROBLEM LIST (Impacting functional limitations):  1. Decreased Strength  2. Decreased ADL/Functional Activities  3. Increased Pain  4. Decreased Activity Tolerance INTERVENTIONS PLANNED:  1. Home Exercise Program (HEP)  2. Neuromuscular Re-education/Strengthening  3. Range of Motion (ROM)  4. Therapeutic Exercise/Strengthening   TREATMENT PLAN:  Effective Dates: 3/6/2020 TO 2020. Frequency/Duration: 1 time a week for 8 weeks  GOALS: (Goals have been discussed and agreed upon with patient.)  SHORT-TERM FUNCTIONAL GOALS: Time Frame: 4 weeks  1. Increase LEFS 4 points to decrease pain 1-2 levels. 2. Increase L LE strength to decrease pain 1-2 levels. 3. Increase L LE strength to allow initiation of stairs. DISCHARGE GOALS: Time Frame: 12 weeks  1.  Increase LEFS 9 points to decrease pain +2 levels. 2. Increase L LE strength to allow reciprocal stairs. 3. Demonstrate independence in home exercises to allow DC from physical therapy. Rehabilitation Potential For Stated Goals: Good  Regarding Clarissa Chacon's therapy, I certify that the treatment plan above will be carried out by a therapist or under their direction. Thank you for this referral,  Denys Post., PT     Referring Physician Signature: Vincenzo Copeland MD              Date                    The information in this section was collected on 3/6/2020 (except where otherwise noted). HISTORY:   History of Present Injury/Illness (Reason for Referral): The patient reported a L knee arthroscopy on 2/14/2020 for a partial menisectomy of her L knee medial and lateral meniscus. She is ambulating independently and has been referred to physical therapy for treatment. Past Medical History/Comorbidities:   Ms. Suzi Barber  has a past medical history of Dry eyes. Ms. Suzi Barber  has a past surgical history that includes hx gyn and hx hernia repair. Social History/Living Environment:      Prior Level of Function/Work/Activity:  Retired  Dominant Side:         RIGHT  Current Medications:       Current Outpatient Medications:     cholecalciferol (VITAMIN D3) (2,000 UNITS /50 MCG) cap capsule, Take  by mouth two (2) times a day., Disp: , Rfl:     naproxen sodium (ALEVE) 220 mg cap, Take  by mouth., Disp: , Rfl:     peg 400-propylene glycol (SYSTANE) 0.4-0.3 % drop, Administer  to left eye as needed. , Disp: , Rfl:     RESTASIS 0.05 % ophthalmic emulsion, 1 Drop every twelve (12) hours. , Disp: , Rfl:     raloxifene (EVISTA) 60 mg tablet, Take 60 mg by mouth daily. Indications: pm, Disp: , Rfl:    Date Last Reviewed:  3/6/2020   Number of Personal Factors/Comorbidities that affect the Plan of Care: 1-2: MODERATE COMPLEXITY   EXAMINATION:     Observation/Orthostatic Postural Assessment:    Independent gait.   Palpation:   Tenderness to L knee joint line. ROM: AROM : L KNEE   Extension   +5   Flexion    +120                    Strength:    -4/5 L LE Strength   +4/5 R LE strength           Special Tests: N/A  Neurological Screen: N/A  Functional Mobility: Independent   Balance:  Good. Body Structures Involved:  1. Joints  2. Muscles Body Functions Affected:  1. Sensory/Pain  2. Neuromusculoskeletal  3. Movement Related Activities and Participation Affected:  1. General Tasks and Demands  2. Mobility  3. Community, Social and [place]   Number of elements (examined above) that affect the Plan of Care: 3: MODERATE COMPLEXITY   CLINICAL PRESENTATION:   Presentation: Evolving clinical presentation with changing clinical characteristics: MODERATE COMPLEXITY   CLINICAL DECISION MAKING:   Outcome Measure: Tool Used: Lower Extremity Functional Scale (LEFS)  Score:  Initial: 38/80 Most Recent: X/80 (Date: -- )   Interpretation of Score: 20 questions each scored on a 5 point scale with 0 representing \"extreme difficulty or unable to perform\" and 4 representing \"no difficulty\". The lower the score, the greater the functional disability. 80/80 represents no disability. Minimal detectable change is 9 points. Medical Necessity:   · Patient demonstrates good rehab potential due to higher previous functional level. Reason for Services/Other Comments:  · Patient continues to require skilled intervention due to decreased ambulatory ability following a L knee arthroscopy. .   Use of outcome tool(s) and clinical judgement create a POC that gives a: Questionable prediction of patient's progress: Bonnie Chua PT

## 2020-03-06 NOTE — PROGRESS NOTES
Ambulatory/Rehab Services H2 Model Falls Risk Assessment    Risk Factors:       No Risk Factors Identified Ability to Rise from Chair:       (0)  Ability to rise in a single movement    Falls Prevention Plan:       No modifications necessary   Total: (5 or greater = High Risk): 0    ©2010 [facility] of [employer]. All Rights Reserved. [facility] Patent #[id number].  Federal Law prohibits the replication, distribution or use without written permission from [facility] [employer]

## 2020-03-06 NOTE — PROGRESS NOTES
Carolina Nano  : 1945  [place]  Secondary: [facility] at [address] [facility]  [address], [address]  [phone]   [phone]       OUTPATIENT PHYSICAL THERAPY: Daily Treatment Note 3/6/2020   ICD-10: Treatment Diagnosis:   R26.2 Difficulty in walking, not elsewhere classified     M25.662 Stiffness of left knee, not elsewhere classified     M25.562 Pain in left knee     Pre-treatment Symptoms/Complaints:  The patient reported a L knee arthroscopy on 2020 for a partial menisectomy of her L knee medial and lateral meniscus. She is ambulating independently and has been referred to physical therapy for treatment. Pain: Initial: Pain Intensity 1: 9  Post Session:  9/10   Medications Last Reviewed:  3/6/2020  Updated Objective Findings:  See evaluation note from today   TREATMENT:   Therapeutic Exercise: ( 15): The patient received treatment as below to improve mobility, strength and balance. Required moderate verbal and manual cues to promote proper body alignment, promote proper body posture and promote proper body mechanics. Progressed resistance, range and repetitions as indicated. The patient received exercise instruction followed by patient demonstration of the exercises to include AROM, PROM, and strengthening exercises for step downs, standing knee extensions, along with hamstring and heel cord stretching to stabilize the patient's LE and improve AROM to decrease spasms, pain, and improve function. [facility] Portal  Evaluation: ( X )  Manual Therapy (     ):   Therapeutic Modalities:   Observation/Orthostatic Postural Assessment:    Independent gait. Palpation:   Tenderness to L knee joint line.   ROM: AROM : L KNEE   Extension   +5   Flexion    +120                    Strength:    -4/5 L LE Strength   +4/5 R LE strength           Special Tests: N/A  Neurological Screen: N/A  Functional Mobility: Independent   Balance:  Good. Treatment/Session Summary:    · Response to Treatment:  The patient was instructed in her initial HEP exercises which she is to perform in conjunction with PT.  · Communication/Consultation:  None today  · Equipment provided today:  None today  · Recommendations/Intent for next treatment session: Next visit will focus on therapeutic exercises to improve function. .  Treatment Plan of Care Effective Dates:  3/6/2020 to 5/6/2020  Total Treatment Billable Duration:  45 minutes  PT Patient Time In/Time Out  Time In: 1100  Time Out: [address] PT    No future appointments.

## 2020-03-11 NOTE — THERAPY EVALUATION
Heather Connor  : 1945  [place] / Product Type: Medicare /  [address]  at [address]  [address], [address]  [phone]   [phone]      OUTPATIENT PHYSICAL THERAPY:Discontinuation Summary 3/11/2020    ICD-10: Treatment Diagnosis:   R26.2 Difficulty in walking, not elsewhere classified     M25.662 Stiffness of left knee, not elsewhere classified     M25.562 Pain in left knee     Precautions/Allergies:   Pcn [penicillins]   Fall Risk Score: 1 (? 5 = High Risk)  MD Orders: Evaluate and treat MEDICAL/REFERRING DIAGNOSIS:  Pain in left knee [M25.562]   DATE OF ONSET: 10/2019  REFERRING PHYSICIAN: Prema Marie PA*  RETURN PHYSICIAN APPOINTMENT: TBD     INITIAL ASSESSMENT:  Ms. Zabrina Wilde reported a recent onset of l knee pain, swelling, and weakness. She is presently ambulating with a cane, and has difficulty rising from a seated posture or climbing stairs. She has been referred to physical therapy for treatment. PROBLEM LIST (Impacting functional limitations):  1. Decreased Strength  2. Decreased ADL/Functional Activities  3. Increased Pain  4. Decreased Activity Tolerance INTERVENTIONS PLANNED:  1. Home Exercise Program (HEP)  2. Neuromuscular Re-education/Strengthening  3. Range of Motion (ROM)  4. Therapeutic Exercise/Strengthening  5. Aquatic Therapy   TREATMENT PLAN:  Effective Dates: 2019 TO 2020. Frequency/Duration: 2 times a week for 8 weeks  GOALS: (Goals have been discussed and agreed upon with patient.)  SHORT-TERM FUNCTIONAL GOALS: Time Frame: 4 weeks                                  ALL STGS ACHIEVED  1. Increase LEFS 4 points to decrease pain 1-2 levels. 2. Increase L knee AROM 10 degrees to decrease pain 1-2 levels. 3. Increase L LE strength to allow initiation of stairs. DISCHARGE GOALS: Time Frame: 8 weeks  1. Increase LEFS 9 points to decrease pain +2 levels. ACHIEVED  2. Increase L LE strength to allow reciprocal stairs. PARTIALLY ACHIEVED  3. Demonstrate independence in home exercises to allow DC from physical therapy. PARTIALLY ACHIEVED  Rehabilitation Potential For Stated Goals: Good  Regarding Guy Chacon's therapy, I certify that the treatment plan above will be carried out by a therapist or under their direction. Thank you for this referral,  Asia Paulino., PT     Referring Physician Signature: VAISHALI Weber*              Date                    The information in this section was collected on 11/13/2019 (except where otherwise noted). HISTORY:   History of Present Injury/Illness (Reason for Referral): The patient reported a recent onset of l knee pain, swelling, and weakness. She is presently ambulating with a cane, and has difficulty rising from a seated posture or climbing stairs. She has been referred to physical therapy for treatment. Past Medical History/Comorbidities:   Ms. Rina Ochoa  has a past medical history of Dry eyes. Ms. Rina Ochoa  has a past surgical history that includes hx gyn and hx hernia repair. Social History/Living Environment:    Independent  Prior Level of Function/Work/Activity:  Retired  Dominant Side:         RIGHT  Current Medications:       Current Outpatient Medications:     cholecalciferol (VITAMIN D3) (2,000 UNITS /50 MCG) cap capsule, Take  by mouth two (2) times a day., Disp: , Rfl:     naproxen sodium (ALEVE) 220 mg cap, Take  by mouth., Disp: , Rfl:     peg 400-propylene glycol (SYSTANE) 0.4-0.3 % drop, Administer  to left eye as needed. , Disp: , Rfl:     RESTASIS 0.05 % ophthalmic emulsion, 1 Drop every twelve (12) hours. , Disp: , Rfl:     raloxifene (EVISTA) 60 mg tablet, Take 60 mg by mouth daily.  Indications: pm, Disp: , Rfl:    Date Last Reviewed:  12/16/2019   Number of Personal Factors/Comorbidities that affect the Plan of Care: 3+: HIGH COMPLEXITY   EXAMINATION:     Observation/Orthostatic Postural Assessment:   Independent ambulation with a cane. Palpation:   Tenderness to l medial and posterior joint line  ROM: AROM : KNEE     R        L       L (12/16)   Extension           0       +8     0   Flexion            140     115   118                    Strength:                                       12/16   +3/5 L LE Strength              -4/5    5/5 R LE Strength                5/5           Special Tests: N/A  Neurological Screen: N/A  Functional Mobility: Independent. Balance:  Good. Body Structures Involved:  1. Joints  2. Muscles Body Functions Affected:  1. Sensory/Pain  2. Neuromusculoskeletal  3. Movement Related Activities and Participation Affected:  1. General Tasks and Demands  2. Mobility  3. Community, Social and [place]   Number of elements (examined above) that affect the Plan of Care: 4+: HIGH COMPLEXITY   CLINICAL PRESENTATION:   Presentation: Evolving clinical presentation with changing clinical characteristics: MODERATE COMPLEXITY   CLINICAL DECISION MAKING:   Outcome Measure: Tool Used: Lower Extremity Functional Scale (LEFS)  Score:  Initial: 28/80 Most Recent: 39/80 (Date:12/16/19 )   Interpretation of Score: 20 questions each scored on a 5 point scale with 0 representing \"extreme difficulty or unable to perform\" and 4 representing \"no difficulty\". The lower the score, the greater the functional disability. 80/80 represents no disability. Minimal detectable change is 9 points. Medical Necessity:   · Patient demonstrates good rehab potential due to higher previous functional level. Reason for Services/Other Comments:  · Patient continues to require skilled intervention due to her limited ability to ambulate independently due to L knee pain. .   Use of outcome tool(s) and clinical judgement create a POC that gives a: Questionable prediction of patient's progress: MODERATE [address]

## 2020-04-28 NOTE — THERAPY EVALUATION
Howard Arellano  : 1945  [place] / Product Type: Medicare /  [address]  at [facility]  [address], [address]  [phone]   [phone]      OUTPATIENT PHYSICAL THERAPY:Discontinuation Summary 2020    ICD-10: Treatment Diagnosis:   R26.2 Difficulty in walking, not elsewhere classified     M25.662 Stiffness of left knee, not elsewhere classified     M25.562 Pain in left knee       Precautions/Allergies:   Pcn [penicillins]   Fall Risk Score: 0 (? 5 = High Risk)  MD Orders: Evaluate and treat MEDICAL/REFERRING DIAGNOSIS:  Complex tear of medial meniscus, current injury, left knee, initial encounter [S83.232A]  Other tear of lateral meniscus, current injury, left knee, initial encounter [X85.968O]   DATE OF ONSET: 2020  REFERRING PHYSICIAN: Tyron Hernandez MD  RETURN PHYSICIAN APPOINTMENT: TBD     INITIAL ASSESSMENT:  Ms. Kayden Gunn reported a L knee arthroscopy on 2020 for a partial menisectomy of her L knee medial and lateral meniscus. She is ambulating independently and has been referred to physical therapy for treatment. PROBLEM LIST (Impacting functional limitations):  1. Decreased Strength  2. Decreased ADL/Functional Activities  3. Increased Pain  4. Decreased Activity Tolerance INTERVENTIONS PLANNED:  1. Home Exercise Program (HEP)  2. Neuromuscular Re-education/Strengthening  3. Range of Motion (ROM)  4. Therapeutic Exercise/Strengthening   TREATMENT PLAN:  Effective Dates: 3/6/2020 TO 2020. Frequency/Duration: 1 time a week for 8 weeks  GOALS: (Goals have been discussed and agreed upon with patient.)  SHORT-TERM FUNCTIONAL GOALS: Time Frame: 4 weeks  1. Increase LEFS 4 points to decrease pain 1-2 levels. 2. Increase L LE strength to decrease pain 1-2 levels. 3. Increase L LE strength to allow initiation of stairs. DISCHARGE GOALS: Time Frame: 12 weeks  1.  Increase LEFS 9 points to decrease pain +2 levels. 2. Increase L LE strength to allow reciprocal stairs. 3. Demonstrate independence in home exercises to allow DC from physical therapy. Rehabilitation Potential For Stated Goals: Good  Regarding Patty Chacon's therapy, I certify that the treatment plan above will be carried out by a therapist or under their direction. Thank you for this referral,  Princess Aggarwal., PT     Referring Physician Signature: Beverley Navarro MD              Date                    The information in this section was collected on 3/6/2020 (except where otherwise noted). HISTORY:   History of Present Injury/Illness (Reason for Referral): The patient reported a L knee arthroscopy on 2/14/2020 for a partial menisectomy of her L knee medial and lateral meniscus. She is ambulating independently and has been referred to physical therapy for treatment. Past Medical History/Comorbidities:   Ms. Justin Gonsalez  has a past medical history of Dry eyes. Ms. Justin Gonsalez  has a past surgical history that includes hx gyn and hx hernia repair. Social History/Living Environment:      Prior Level of Function/Work/Activity:  Retired  Dominant Side:         RIGHT  Current Medications:       Current Outpatient Medications:     cholecalciferol (VITAMIN D3) (2,000 UNITS /50 MCG) cap capsule, Take  by mouth two (2) times a day., Disp: , Rfl:     naproxen sodium (ALEVE) 220 mg cap, Take  by mouth., Disp: , Rfl:     peg 400-propylene glycol (SYSTANE) 0.4-0.3 % drop, Administer  to left eye as needed. , Disp: , Rfl:     RESTASIS 0.05 % ophthalmic emulsion, 1 Drop every twelve (12) hours. , Disp: , Rfl:     raloxifene (EVISTA) 60 mg tablet, Take 60 mg by mouth daily. Indications: pm, Disp: , Rfl:    Date Last Reviewed:  3/6/2020   Number of Personal Factors/Comorbidities that affect the Plan of Care: 1-2: MODERATE COMPLEXITY   EXAMINATION:     Observation/Orthostatic Postural Assessment:    Independent gait.   Palpation:   Tenderness to L knee joint line. ROM: AROM : L KNEE   Extension   +5   Flexion    +120                    Strength:    -4/5 L LE Strength   +4/5 R LE strength           Special Tests: N/A  Neurological Screen: N/A  Functional Mobility: Independent   Balance:  Good. Body Structures Involved:  1. Joints  2. Muscles Body Functions Affected:  1. Sensory/Pain  2. Neuromusculoskeletal  3. Movement Related Activities and Participation Affected:  1. General Tasks and Demands  2. Mobility  3. Community, Social and [facility] [employer] [facility]   Number of elements (examined above) that affect the Plan of Care: 3: MODERATE COMPLEXITY   CLINICAL PRESENTATION:   Presentation: Evolving clinical presentation with changing clinical characteristics: MODERATE COMPLEXITY   CLINICAL DECISION MAKING:   Outcome Measure: Tool Used: Lower Extremity Functional Scale (LEFS)  Score:  Initial: 38/80 Most Recent: X/80 (Date: -- )   Interpretation of Score: 20 questions each scored on a 5 point scale with 0 representing \"extreme difficulty or unable to perform\" and 4 representing \"no difficulty\". The lower the score, the greater the functional disability. 80/80 represents no disability. Minimal detectable change is 9 points. Medical Necessity:   · Patient demonstrates good rehab potential due to higher previous functional level. Reason for Services/Other Comments:  · Patient continues to require skilled intervention due to decreased ambulatory ability following a L knee arthroscopy. .   Use of outcome tool(s) and clinical judgement create a POC that gives a: Questionable prediction of patient's progress: Bonnie Barrow Inch PT

## 2020-07-24 ENCOUNTER — HOSPITAL ENCOUNTER (OUTPATIENT)
Dept: PHYSICAL THERAPY | Age: 75
Discharge: HOME | End: 2020-07-24
Payer: MEDICARE

## 2020-07-24 PROCEDURE — 97162 PT EVAL MOD COMPLEX 30 MIN: CPT

## 2020-07-24 PROCEDURE — 97110 THERAPEUTIC EXERCISES: CPT

## 2020-07-24 NOTE — PROGRESS NOTES
Damon Naylor  : 1945  [place]  [place] [address] at [facility]  [address], [address]  [phone]   [phone]       OUTPATIENT PHYSICAL THERAPY: Daily Treatment Note 2020   M75.41 Impingement syndrome of right shoulder       Pre-treatment Symptoms/Complaints: The patient reported a recent onset of R shoulder pain with lifting and reaching activities. She is currently taking NSAIDs which have decreased her symptoms, and has been referred to physical therapy for treatment. Pain: Initial: Pain Intensity 1: 8  Post Session: 8/10   Medications Last Reviewed:  2020  Updated Objective Findings:  See evaluation note from today   TREATMENT:   Therapeutic Exercise: ( 15): The patient received treatment as below to improve mobility, strength and balance. Required moderate verbal and manual cues to promote proper body alignment, promote proper body posture and promote proper body mechanics. Progressed resistance, range and repetitions as indicated. The patient received exercise instruction followed by patient demonstration of the exercises to include AROM, PROM, and strengthening exercises for IR, ER, and \"empty can\" with yellow t-band to stabilize the patient's shoulder and decrease  pain, and improve function. [facility] Portal  Evaluation: ( X )  Manual Therapy (     ):   Therapeutic Modalities:   Observation/Orthostatic Postural Assessment:   WNL  Palpation:   Tenderness to her R supraspinatus fossa. ROM: AROM : R SHOULDER   WNL                       Strength:    -4/5 R RC Cuff   +4/5 L RC Cuff           Special Tests: N/A  Neurological Screen: N/A  Functional Mobility: Independent   Balance:  Good. Treatment/Session Summary:    · Response to Treatment:   The patient was instructed in an independent HEP.   · Communication/Consultation:  None today  · Equipment provided today:  None today  · Recommendations/Intent for next treatment session: DC to a HEP. Treatment Plan of Care Effective Dates:  7/24/2020 to 8/24/2020  Total Treatment Billable Duration:  45 minutes  PT Patient Time In/Time Out  Time In: 1100  Time Out: [address] PT    No future appointments.

## 2020-07-24 NOTE — PROGRESS NOTES
Ambulatory/Rehab Services H2 Model Falls Risk Assessment    Risk Factors:       No Risk Factors Identified Ability to Rise from Chair:       (0)  Ability to rise in a single movement    Falls Prevention Plan:       No modifications necessary   Total: (5 or greater = High Risk): 0    ©2010 [facility] of [employer]. All Rights Reserved. [facility] Patent #[id number].  Federal Law prohibits the replication, distribution or use without written permission from [facility] [employer]

## 2020-07-24 NOTE — THERAPY EVALUATION
Izzy Ornelas  : 1945  [place]or/Plan   [place]. [place]                                   [address]   2. [address] [place], [place] [id number]    [address] [place] [address] Dr at [facility]  [address], [address]  [phone]   [phone]      OUTPATIENT PHYSICAL THERAPY:Initial Assessment and Discharge 2020    ICD-10: Treatment Diagnosis:   M75.41 Impingement syndrome of right shoulder       Precautions/Allergies:   Pcn [penicillins]   Fall Risk Score: 0 (? 5 = High Risk)  MD Orders: Evaluate and Treat MEDICAL/REFERRING DIAGNOSIS:  Pain in left shoulder [M25.512]   DATE OF ONSET:2020  REFERRING PHYSICIAN: Nitish Patterson MD  RETURN PHYSICIAN APPOINTMENT: TBD     INITIAL ASSESSMENT:  Ms. Tim Wong  reported a recent onset of R shoulder pain with lifting and reaching activities. She is currently taking NSAIDs which have decreased her symptoms, and has been referred to physical therapy for treatment. PROBLEM LIST (Impacting functional limitations):  1. Decreased Strength  2. Decreased ADL/Functional Activities  3. Increased Pain  4. Decreased Activity Tolerance INTERVENTIONS PLANNED:  1. Home Exercise Program (HEP)  2. Neuromuscular Re-education/Strengthening  3. Therapeutic Exercise/Strengthening   TREATMENT PLAN:  Effective Dates: 2020 TO 2020. Frequency/Duration: 1 time a week for 4 weeks  GOALS: (Goals have been discussed and agreed upon with patient.)  DISCHARGE GOALS: Time Frame: 1 visit  1. Demonstrate independence in home exercises to allow DC from physical therapy. ACHIEVED  Rehabilitation Potential For Stated Goals: Good  Regarding Danielle Chacon's therapy, I certify that the treatment plan above will be carried out by a therapist or under their direction. Thank you for this referral,  Nitesh Rose., PT     Referring Physician Signature:  Nitish Patterson MD              Date The information in this section was collected on 7/24/2020 (except where otherwise noted). HISTORY:   History of Present Injury/Illness (Reason for Referral): The patient reported a recent onset of R shoulder pain with lifting and reaching activities. She is currently taking NSAIDs which have decreased her symptoms, and has been referred to physical therapy for treatment. Past Medical History/Comorbidities:   Ms. Rose Rooney  has a past medical history of Dry eyes. Ms. Rose Rooney  has a past surgical history that includes hx gyn and hx hernia repair. Social History/Living Environment:      Prior Level of Function/Work/Activity:  Retired  Dominant Side:         RIGHT  Current Medications:       Current Outpatient Medications:     cholecalciferol (VITAMIN D3) (2,000 UNITS /50 MCG) cap capsule, Take  by mouth two (2) times a day., Disp: , Rfl:     naproxen sodium (ALEVE) 220 mg cap, Take  by mouth., Disp: , Rfl:     peg 400-propylene glycol (SYSTANE) 0.4-0.3 % drop, Administer  to left eye as needed. , Disp: , Rfl:     RESTASIS 0.05 % ophthalmic emulsion, 1 Drop every twelve (12) hours. , Disp: , Rfl:     raloxifene (EVISTA) 60 mg tablet, Take 60 mg by mouth daily. Indications: pm, Disp: , Rfl:    Date Last Reviewed:  7/24/2020   Number of Personal Factors/Comorbidities that affect the Plan of Care: 1-2: MODERATE COMPLEXITY   EXAMINATION:   Observation/Orthostatic Postural Assessment:   WNL  Palpation:   Tenderness to her R supraspinatus fossa. ROM: AROM : R SHOULDER   WNL                       Strength:    -4/5 R RC Cuff   +4/5 L RC Cuff           Special Tests: N/A  Neurological Screen: N/A  Functional Mobility: Independent   Balance:  Good. Body Structures Involved:  1. Muscles Body Functions Affected:  1. Sensory/Pain  2. Neuromusculoskeletal  3. Movement Related Activities and Participation Affected:  1. General Tasks and Demands  2.  Self Care   Number of elements (examined above) that affect the Plan of Care: 3: MODERATE COMPLEXITY   CLINICAL PRESENTATION:   Presentation: Evolving clinical presentation with changing clinical characteristics: MODERATE COMPLEXITY   CLINICAL DECISION MAKING:   Outcome Measure: Tool Used: Disabilities of the Arm, Shoulder and Hand (DASH) Questionnaire - Quick Version  Score:  Initial: 29/55  Most Recent: X/55 (Date: -- )   Interpretation of Score: The DASH is designed to measure the activities of daily living in person's with upper extremity dysfunction or pain. Each section is scored on a 1-5 scale, 5 representing the greatest disability. The scores of each section are added together for a total score of 55. Medical Necessity:   · Patient demonstrates good rehab potential due to higher previous functional level. Reason for Services/Other Comments:  · Patient continues to require skilled intervention due to limited ability to perform R UE ADLs. .   Use of outcome tool(s) and clinical judgement create a POC that gives a: Questionable prediction of patient's progress: Bonnie Frazier PT

## (undated) DEVICE — BANDAGE COBAN 6 IN WND 6INX5YD FOAM

## (undated) DEVICE — INTENDED FOR TISSUE SEPARATION, AND OTHER PROCEDURES THAT REQUIRE A SHARP SURGICAL BLADE TO PUNCTURE OR CUT.: Brand: BARD-PARKER ® STAINLESS STEEL BLADES

## (undated) DEVICE — AMD ANTIMICROBIAL GAUZE SPONGES,12 PLY USP TYPE VII, 0.2% POLYHEXAMETHYLENE BIGUANIDE HCI (PHMB): Brand: CURITY

## (undated) DEVICE — BLADE SHV CUT MENIS AGG + 4MM --

## (undated) DEVICE — T-DRAPE,EXTREMITY,STERILE: Brand: MEDLINE

## (undated) DEVICE — NEEDLE HYPO 18GA L1.5IN PNK S STL HUB POLYPR SHLD REG BVL

## (undated) DEVICE — STERILE POLYISOPRENE POWDER-FREE SURGICAL GLOVES: Brand: PROTEXIS

## (undated) DEVICE — SET IRRIG DST FLX M CONN

## (undated) DEVICE — SYR LR LCK 1ML GRAD NSAF 30ML --

## (undated) DEVICE — NEEDLE HYPO 18GA L1.5IN THN WALL PIVOTING SHLD BVL ORIENTED

## (undated) DEVICE — WEREWOLF FLOW 50 COBLATION WAND: Brand: COBLATION

## (undated) DEVICE — KNEE ARTHRO LEE-ROBERSON: Brand: MEDLINE INDUSTRIES, INC.

## (undated) DEVICE — SOLUTION IRRIG 3000ML 0.9% SOD CHL FLX CONT 0797208] ICU MEDICAL INC]

## (undated) DEVICE — (D)PREP SKN CHLRAPRP APPL 26ML -- CONVERT TO ITEM 371833

## (undated) DEVICE — STOCKINETTE,IMPERVIOUS,12X48,STERILE: Brand: MEDLINE

## (undated) DEVICE — STERILE HOOK LOCK LATEX FREE ELASTIC BANDAGE 6INX5YD: Brand: HOOK LOCK™

## (undated) DEVICE — ZIMMER® STERILE DISPOSABLE TOURNIQUET CUFF WITH PLC, DUAL PORT, SINGLE BLADDER, 30 IN. (76 CM)

## (undated) DEVICE — PADDING CAST W4INXL4YD ST COT COHESIVE HND TEARABLE SPEC

## (undated) DEVICE — DRESSING,GAUZE,XEROFORM,CURAD,1"X8",ST: Brand: CURAD

## (undated) DEVICE — SET IRRIG L94IN ID0.281IN W/ 4.5IN DST FLX CONN 2 LD ON OFF